# Patient Record
Sex: FEMALE | Race: WHITE | Employment: OTHER | URBAN - METROPOLITAN AREA
[De-identification: names, ages, dates, MRNs, and addresses within clinical notes are randomized per-mention and may not be internally consistent; named-entity substitution may affect disease eponyms.]

---

## 2019-05-13 ENCOUNTER — HOSPITAL ENCOUNTER (OUTPATIENT)
Dept: CARDIOLOGY | Facility: CLINIC | Age: 84
Discharge: HOME | End: 2019-05-13
Attending: PHYSICIAN ASSISTANT | Admitting: PHYSICIAN ASSISTANT
Payer: COMMERCIAL

## 2019-05-13 DIAGNOSIS — R01.1 SYSTOLIC MURMUR: ICD-10-CM

## 2019-05-13 PROCEDURE — 93306 TTE W/DOPPLER COMPLETE: CPT

## 2019-05-13 PROCEDURE — 93306 TTE W/DOPPLER COMPLETE: CPT | Mod: 26 | Performed by: INTERNAL MEDICINE

## 2019-09-09 ENCOUNTER — TELEPHONE (OUTPATIENT)
Dept: OTHER | Facility: CLINIC | Age: 84
End: 2019-09-09

## 2019-09-09 DIAGNOSIS — I73.9 CLAUDICATION (H): Primary | ICD-10-CM

## 2019-09-09 NOTE — TELEPHONE ENCOUNTER
Referral received via fax.     Pt referred to [facility] by Kristin Bo PA-C for bilateral calf claudication, left greater than right, unable to walk usual 3 miles a day.     Progress notes from [facility] in nurses office.     Pt needs to be scheduled for ESTEPHANIA with exercise (unless with Dr. Hernandez) and consult with vascular medicine.  Will route to scheduling to coordinate an appointment at next available.    CACHORRO PhanN, RN

## 2019-09-10 NOTE — TELEPHONE ENCOUNTER
No OV notes in chart or office.  I called [facility] to obtain LOV notes- no recent imaging completed.  Received fax with LOV and will give to Dr. Hernandez prior to consult.     America IVORYN, RN

## 2019-10-01 ENCOUNTER — OFFICE VISIT (OUTPATIENT)
Dept: SURGERY | Facility: CLINIC | Age: 84
End: 2019-10-01
Payer: COMMERCIAL

## 2019-10-01 ENCOUNTER — HOSPITAL ENCOUNTER (OUTPATIENT)
Dept: LAB | Facility: CLINIC | Age: 84
Discharge: HOME | End: 2019-10-01
Attending: INTERNAL MEDICINE | Admitting: INTERNAL MEDICINE
Payer: COMMERCIAL

## 2019-10-01 VITALS
RESPIRATION RATE: 16 BRPM | HEIGHT: 58 IN | HEART RATE: 85 BPM | SYSTOLIC BLOOD PRESSURE: 134 MMHG | BODY MASS INDEX: 23.09 KG/M2 | DIASTOLIC BLOOD PRESSURE: 84 MMHG | OXYGEN SATURATION: 96 % | WEIGHT: 110 LBS

## 2019-10-01 DIAGNOSIS — I73.9 PAD (PERIPHERAL ARTERY DISEASE) (H): Primary | ICD-10-CM

## 2019-10-01 DIAGNOSIS — R09.89 BILATERAL CAROTID BRUITS: ICD-10-CM

## 2019-10-01 DIAGNOSIS — I73.9 PAD (PERIPHERAL ARTERY DISEASE) (H): ICD-10-CM

## 2019-10-01 LAB
ALBUMIN SERPL-MCNC: 4.1 G/DL (ref 3.4–5)
ALP SERPL-CCNC: 68 U/L (ref 40–150)
ALT SERPL W P-5'-P-CCNC: 29 U/L (ref 0–50)
ANION GAP SERPL CALCULATED.3IONS-SCNC: 5 MMOL/L (ref 3–14)
AST SERPL W P-5'-P-CCNC: 22 U/L (ref 0–45)
BASOPHILS # BLD AUTO: 0 10E9/L (ref 0–0.2)
BASOPHILS NFR BLD AUTO: 0.6 %
BILIRUB SERPL-MCNC: 0.3 MG/DL (ref 0.2–1.3)
BUN SERPL-MCNC: 16 MG/DL (ref 7–30)
CALCIUM SERPL-MCNC: 9.4 MG/DL (ref 8.5–10.1)
CHLORIDE SERPL-SCNC: 108 MMOL/L (ref 94–109)
CHOLEST SERPL-MCNC: 193 MG/DL
CO2 SERPL-SCNC: 29 MMOL/L (ref 20–32)
CREAT SERPL-MCNC: 0.82 MG/DL (ref 0.52–1.04)
DIFFERENTIAL METHOD BLD: NORMAL
EOSINOPHIL # BLD AUTO: 0 10E9/L (ref 0–0.7)
EOSINOPHIL NFR BLD AUTO: 0.5 %
ERYTHROCYTE [DISTWIDTH] IN BLOOD BY AUTOMATED COUNT: 13.4 % (ref 10–15)
GFR SERPL CREATININE-BSD FRML MDRD: 66 ML/MIN/{1.73_M2}
GLUCOSE SERPL-MCNC: 94 MG/DL (ref 70–99)
HCT VFR BLD AUTO: 43.1 % (ref 35–47)
HDLC SERPL-MCNC: 64 MG/DL
HGB BLD-MCNC: 14.6 G/DL (ref 11.7–15.7)
IMM GRANULOCYTES # BLD: 0 10E9/L (ref 0–0.4)
IMM GRANULOCYTES NFR BLD: 0.2 %
LDLC SERPL CALC-MCNC: 113 MG/DL
LYMPHOCYTES # BLD AUTO: 2 10E9/L (ref 0.8–5.3)
LYMPHOCYTES NFR BLD AUTO: 31.2 %
MCH RBC QN AUTO: 30 PG (ref 26.5–33)
MCHC RBC AUTO-ENTMCNC: 33.9 G/DL (ref 31.5–36.5)
MCV RBC AUTO: 89 FL (ref 78–100)
MONOCYTES # BLD AUTO: 0.4 10E9/L (ref 0–1.3)
MONOCYTES NFR BLD AUTO: 6.9 %
NEUTROPHILS # BLD AUTO: 3.9 10E9/L (ref 1.6–8.3)
NEUTROPHILS NFR BLD AUTO: 60.6 %
NONHDLC SERPL-MCNC: 129 MG/DL
NRBC # BLD AUTO: 0 10*3/UL
NRBC BLD AUTO-RTO: 0 /100
PLATELET # BLD AUTO: 186 10E9/L (ref 150–450)
POTASSIUM SERPL-SCNC: 3.7 MMOL/L (ref 3.4–5.3)
PROT SERPL-MCNC: 7.5 G/DL (ref 6.8–8.8)
RBC # BLD AUTO: 4.87 10E12/L (ref 3.8–5.2)
SODIUM SERPL-SCNC: 142 MMOL/L (ref 133–144)
TRIGL SERPL-MCNC: 78 MG/DL
WBC # BLD AUTO: 6.4 10E9/L (ref 4–11)

## 2019-10-01 PROCEDURE — 80053 COMPREHEN METABOLIC PANEL: CPT | Performed by: INTERNAL MEDICINE

## 2019-10-01 PROCEDURE — 99204 OFFICE O/P NEW MOD 45 MIN: CPT | Performed by: INTERNAL MEDICINE

## 2019-10-01 PROCEDURE — 80061 LIPID PANEL: CPT | Performed by: INTERNAL MEDICINE

## 2019-10-01 PROCEDURE — 85025 COMPLETE CBC W/AUTO DIFF WBC: CPT | Performed by: INTERNAL MEDICINE

## 2019-10-01 PROCEDURE — 36415 COLL VENOUS BLD VENIPUNCTURE: CPT | Performed by: INTERNAL MEDICINE

## 2019-10-01 RX ORDER — VITS A,C,E/LUTEIN/MINERALS 300MCG-200
1 TABLET ORAL EVERY MORNING
COMMUNITY

## 2019-10-01 RX ORDER — SIMVASTATIN 10 MG
10 TABLET ORAL EVERY EVENING
Status: ON HOLD | COMMUNITY
Start: 2019-09-30 | End: 2019-12-05

## 2019-10-01 RX ORDER — AMLODIPINE BESYLATE 10 MG/1
10 TABLET ORAL EVERY EVENING
COMMUNITY
Start: 2019-09-30

## 2019-10-01 RX ORDER — MULTIPLE VITAMINS W/ MINERALS TAB 9MG-400MCG
1 TAB ORAL EVERY MORNING
COMMUNITY

## 2019-10-01 SDOH — HEALTH STABILITY: MENTAL HEALTH: HOW OFTEN DO YOU HAVE A DRINK CONTAINING ALCOHOL?: 2-3 TIMES A WEEK

## 2019-10-01 SDOH — HEALTH STABILITY: MENTAL HEALTH: HOW MANY DRINKS CONTAINING ALCOHOL DO YOU HAVE ON A TYPICAL DAY WHEN YOU ARE DRINKING?: 1 OR 2

## 2019-10-01 SDOH — HEALTH STABILITY: MENTAL HEALTH: HOW OFTEN DO YOU HAVE SIX OR MORE DRINKS ON ONE OCCASION?: NEVER

## 2019-10-01 ASSESSMENT — MIFFLIN-ST. JEOR: SCORE: 847.68

## 2019-10-01 NOTE — PROGRESS NOTES
Vascular Medicine Progress Note     Nova Ordoñez is a 84 year old female who is here for PAD evaluation    Interval History   Patient vascular risk factors include  Age  History of smoking  Hyperlipidemia patient is already on statin  Patient is nondiabetic  Patient has no problems with hypertension  Patient is considered pretty active  Patient is here for PAD evaluation, patient is having typical claudication she would walk for half a mile and she will start having pain involving both calves pain is so severe patient has to stop walking rest for a while then she can resume walking pain will start left leg then it will involve the right leg  Patient denies any history of rest pain, nocturnal pain, skin color or temperature changes, no history of gangrene in the past  Patient denies any history of coronary artery disease, no chest pain no shortness of breath no palpitations  Patient denies any family history of PAD  Patient is on blood pressure medication and statins  Patient does not take any aspirin    Physical Exam       BP: 134/84 Pulse: 85   Resp: 16 SpO2: 96 %      Vitals:    10/01/19 1303   Weight: 49.9 kg (110 lb)     Vital Signs with Ranges  Pulse:  [85] 85  Resp:  [16] 16  BP: (128-134)/(76-84) 134/84  SpO2:  [96 %] 96 %  [unfilled]    Constitutional: awake, alert, cooperative, no apparent distress, and appears stated age  Eyes: Lids and lashes normal, pupils equal, round and reactive to light, extra ocular muscles intact, sclera clear, conjunctiva normal  ENT: normocepalic, without obvious abnormality, oropharynx pink and moist  Hematologic / Lymphatic: no lymphadenopathy  Respiratory: No increased work of breathing, good air exchange, clear to auscultation bilaterally, no crackles or wheezing  Cardiovascular: regular rate and rhythm, normal S1 and S2 and no murmur noted  GI: Normal bowel sounds, soft, non-distended, non-tender  Skin: no redness, warmth, or swelling, no rashes and no  lesions  Musculoskeletal: There is no redness, warmth, or swelling of the joints.  Full range of motion noted.  Motor strength is 5 out of 5 all extremities bilaterally.  Tone is normal.  Neurologic: Awake, alert, oriented to name, place and time.  Cranial nerves II-XII are grossly intact.  Motor is 5 out of 5 bilaterally.    Neuropsychiatric:  Normal affect, memory, insight.  Pulses: Dopplerable DP and PT pulses, mainly biphasic signals obtained  .  Bilateral right greater than left carotid bruits appreciated.     Medications         Data   No results found for this or any previous visit (from the past 24 hour(s)).    Assessment & Plan   (I73.9) PAD (peripheral artery disease) (H)  (primary encounter diagnosis)  Comment: Bilateral lower extremity claudication most probably secondary to vascular etiology rather than neurogenic although neurogenic etiology cannot be dismissed as of now  Plan: CBC with platelets differential, Comprehensive         metabolic panel, Lipid Profile, US ESTEPHANIA Doppler         with Exercise Bilateral            (R09.89) Bilateral carotid bruits  Comment: Bilateral carotid bruits right greater than left  Plan: CBC with platelets differential, Comprehensive         metabolic panel, Lipid Profile, US Carotid         Bilateral                Summary: We will see the patient once test results are available    Omer Hernandez MD

## 2019-10-07 ENCOUNTER — HOSPITAL ENCOUNTER (OUTPATIENT)
Dept: ULTRASOUND IMAGING | Facility: CLINIC | Age: 84
Discharge: HOME | End: 2019-10-07
Attending: INTERNAL MEDICINE | Admitting: INTERNAL MEDICINE
Payer: COMMERCIAL

## 2019-10-07 ENCOUNTER — HOSPITAL ENCOUNTER (OUTPATIENT)
Dept: ULTRASOUND IMAGING | Facility: CLINIC | Age: 84
End: 2019-10-07
Attending: INTERNAL MEDICINE
Payer: COMMERCIAL

## 2019-10-07 DIAGNOSIS — I73.9 PAD (PERIPHERAL ARTERY DISEASE) (H): ICD-10-CM

## 2019-10-07 DIAGNOSIS — R09.89 BILATERAL CAROTID BRUITS: ICD-10-CM

## 2019-10-07 PROCEDURE — 93880 EXTRACRANIAL BILAT STUDY: CPT

## 2019-10-07 PROCEDURE — 93924 LWR XTR VASC STDY BILAT: CPT

## 2019-11-07 ENCOUNTER — TELEPHONE (OUTPATIENT)
Dept: OTHER | Facility: CLINIC | Age: 84
End: 2019-11-07

## 2019-11-07 DIAGNOSIS — I65.29 CAROTID STENOSIS: Primary | ICD-10-CM

## 2019-11-07 NOTE — TELEPHONE ENCOUNTER
Patient is scheduled for follow up with Dr. Hernandez on 11/12/19.    Carotid US on 10/7/19 showing:  IMPRESSION:    1. Greater than 70% diameter stenosis of the right ICA relative to the  distal ICA diameter.  2. Greater than 70% diameter stenosis of the left ICA relative to the  distal ICA diameter.    Wondering if he would like to order CTA head/neck and to schedule with vascular surgery instead or if he would like patient to still follow up on 11/12/19 and then decide.    America IVORYN, RN    [facility]  [facility]  Office: [phone]  Fax: [phone]

## 2019-11-07 NOTE — TELEPHONE ENCOUNTER
Per Dr. Hernandez patient needs CTA head and neck and consult with vascular surgery. I called patient and discussed.   CTA head and neck ordered- routing to scheduling to contact patient and schedule CTA and vascular surgery consult and please cancel appt with Dr. Hernandez.    America Zavala  BSN, RN    [facility]  [facility]  Office: [phone]  Fax: [phone]

## 2019-11-11 ENCOUNTER — HOSPITAL ENCOUNTER (OUTPATIENT)
Dept: CT IMAGING | Facility: CLINIC | Age: 84
Discharge: HOME | End: 2019-11-11
Attending: INTERNAL MEDICINE | Admitting: INTERNAL MEDICINE
Payer: COMMERCIAL

## 2019-11-11 DIAGNOSIS — I65.29 CAROTID STENOSIS: ICD-10-CM

## 2019-11-11 LAB
CREAT BLD-MCNC: 0.8 MG/DL (ref 0.52–1.04)
GFR SERPL CREATININE-BSD FRML MDRD: 69 ML/MIN/{1.73_M2}

## 2019-11-11 PROCEDURE — 82565 ASSAY OF CREATININE: CPT

## 2019-11-11 PROCEDURE — 25000125 ZZHC RX 250: Performed by: INTERNAL MEDICINE

## 2019-11-11 PROCEDURE — 70498 CT ANGIOGRAPHY NECK: CPT

## 2019-11-11 PROCEDURE — 25000128 H RX IP 250 OP 636: Performed by: INTERNAL MEDICINE

## 2019-11-11 RX ORDER — IOPAMIDOL 755 MG/ML
500 INJECTION, SOLUTION INTRAVASCULAR ONCE
Status: COMPLETED | OUTPATIENT
Start: 2019-11-11 | End: 2019-11-11

## 2019-11-11 RX ADMIN — SODIUM CHLORIDE 80 ML: 9 INJECTION, SOLUTION INTRAVENOUS at 16:15

## 2019-11-11 RX ADMIN — IOPAMIDOL 70 ML: 755 INJECTION, SOLUTION INTRAVENOUS at 16:15

## 2019-11-14 ENCOUNTER — OFFICE VISIT (OUTPATIENT)
Dept: SURGERY | Facility: CLINIC | Age: 84
End: 2019-11-14
Payer: COMMERCIAL

## 2019-11-14 VITALS
BODY MASS INDEX: 23.09 KG/M2 | RESPIRATION RATE: 16 BRPM | DIASTOLIC BLOOD PRESSURE: 72 MMHG | WEIGHT: 110 LBS | SYSTOLIC BLOOD PRESSURE: 128 MMHG | OXYGEN SATURATION: 95 % | HEIGHT: 58 IN | HEART RATE: 91 BPM

## 2019-11-14 DIAGNOSIS — I65.23 CAROTID STENOSIS, ASYMPTOMATIC, BILATERAL: Primary | ICD-10-CM

## 2019-11-14 PROCEDURE — 99204 OFFICE O/P NEW MOD 45 MIN: CPT | Performed by: SURGERY

## 2019-11-14 ASSESSMENT — MIFFLIN-ST. JEOR: SCORE: 847.68

## 2019-11-14 NOTE — PROGRESS NOTES
85 y/o female with bilateral asymptomatic carotid stenosis (althuogh she has macular degeneration and eye changes are difficult to assess).  Carotids 4/4 with bilateral bruits.  Motor/sensory grossly intact.  CTA shows bilateral carotid stenosis  Right-77%  Left-81%.  Discussed options of therapy as well as risks and goals.  I believe open left carotid endarterectomy is indicated.  She appears to understand and wishes to proceed.  Face to face time 45 minutes greater than 50% in consultation.

## 2019-11-15 PROBLEM — I65.23 CAROTID STENOSIS, ASYMPTOMATIC, BILATERAL: Status: ACTIVE | Noted: 2019-11-15

## 2019-11-25 ENCOUNTER — TELEPHONE (OUTPATIENT)
Dept: OTHER | Facility: CLINIC | Age: 84
End: 2019-11-25

## 2019-11-25 NOTE — TELEPHONE ENCOUNTER
Returned patient's call.    Patient IS NOT experiencing a new vision change.    She is concerned that the surgery may affect the already limited vision she has.    She would like to discuss this with a nurse.    Routing to surgical nurse triage.  Alice Busby RN BSN  Vascular [facility]

## 2019-11-25 NOTE — TELEPHONE ENCOUNTER
Will discuss with Dr. Marquez when he is in clinic Wednesday and call her back.    America PRIETO, RN    [facility]  Vascular [facility]  Office: [phone]  Fax: [phone]

## 2019-11-25 NOTE — TELEPHONE ENCOUNTER
Patient called to schedule her first PO after her 12/02/19 surgery with Dr Marquez. Patient is concerned about loss of vision in her left eye. She already has limited vision in that eye. Nova would like to talk to someone about this.

## 2019-11-27 ENCOUNTER — TELEPHONE (OUTPATIENT)
Dept: OTHER | Facility: CLINIC | Age: 84
End: 2019-11-27

## 2019-11-27 NOTE — TELEPHONE ENCOUNTER
Type of surgery: LEFT CAROTID ENDARTERECTOMY WITH EEG  Location of surgery: [facility]  Date and time of surgery: 12/4/19 @ 9:20 AM  Surgeon: DR. ROJAS  Pre-Op Appt Date: PT TO SCHEDULE  Post-Op Appt Date: PT TO SCHEDULE   Packet sent out: Yes  Pre-cert/Authorization completed:  Yes  Date: 11/27/19

## 2019-12-03 RX ORDER — MULTIVIT-MIN/IRON/FOLIC ACID/K 18-600-40
1000 CAPSULE ORAL EVERY EVENING
COMMUNITY

## 2019-12-04 ENCOUNTER — ANESTHESIA (OUTPATIENT)
Dept: SURGERY | Facility: CLINIC | Age: 84
DRG: 039 | End: 2019-12-04
Payer: COMMERCIAL

## 2019-12-04 ENCOUNTER — HOSPITAL ENCOUNTER (INPATIENT)
Facility: CLINIC | Age: 84
LOS: 1 days | Discharge: HOME | DRG: 039 | End: 2019-12-05
Attending: SURGERY | Admitting: SURGERY
Payer: COMMERCIAL

## 2019-12-04 ENCOUNTER — APPOINTMENT (OUTPATIENT)
Dept: SURGERY | Facility: PHYSICIAN GROUP | Age: 84
End: 2019-12-04
Payer: COMMERCIAL

## 2019-12-04 ENCOUNTER — ANESTHESIA EVENT (OUTPATIENT)
Dept: SURGERY | Facility: CLINIC | Age: 84
DRG: 039 | End: 2019-12-04
Payer: COMMERCIAL

## 2019-12-04 DIAGNOSIS — I65.23 CAROTID STENOSIS, ASYMPTOMATIC, BILATERAL: ICD-10-CM

## 2019-12-04 DIAGNOSIS — I65.21 CAROTID STENOSIS, ASYMPTOMATIC, RIGHT: Primary | ICD-10-CM

## 2019-12-04 LAB
ABO + RH BLD: NORMAL
ABO + RH BLD: NORMAL
ANION GAP SERPL CALCULATED.3IONS-SCNC: 3 MMOL/L (ref 3–14)
BLD GP AB SCN SERPL QL: NORMAL
BLOOD BANK CMNT PATIENT-IMP: NORMAL
BUN SERPL-MCNC: 17 MG/DL (ref 7–30)
CALCIUM SERPL-MCNC: 9 MG/DL (ref 8.5–10.1)
CHLORIDE SERPL-SCNC: 108 MMOL/L (ref 94–109)
CHOLEST SERPL-MCNC: 150 MG/DL
CO2 SERPL-SCNC: 28 MMOL/L (ref 20–32)
CREAT SERPL-MCNC: 0.88 MG/DL (ref 0.52–1.04)
GFR SERPL CREATININE-BSD FRML MDRD: 60 ML/MIN/{1.73_M2}
GLUCOSE SERPL-MCNC: 91 MG/DL (ref 70–99)
HBA1C MFR BLD: 5.4 % (ref 0–5.6)
HDLC SERPL-MCNC: 68 MG/DL
LDLC SERPL CALC-MCNC: 69 MG/DL
NONHDLC SERPL-MCNC: 82 MG/DL
POTASSIUM SERPL-SCNC: 3.2 MMOL/L (ref 3.4–5.3)
SODIUM SERPL-SCNC: 139 MMOL/L (ref 133–144)
SPECIMEN EXP DATE BLD: NORMAL
TRIGL SERPL-MCNC: 66 MG/DL

## 2019-12-04 PROCEDURE — 35301 RECHANNELING OF ARTERY: CPT | Mod: LT | Performed by: SURGERY

## 2019-12-04 PROCEDURE — 25000128 H RX IP 250 OP 636: Performed by: SURGERY

## 2019-12-04 PROCEDURE — 36415 COLL VENOUS BLD VENIPUNCTURE: CPT | Performed by: SURGERY

## 2019-12-04 PROCEDURE — 37000009 ZZH ANESTHESIA TECHNICAL FEE, EACH ADDTL 15 MIN: Performed by: SURGERY

## 2019-12-04 PROCEDURE — 25000128 H RX IP 250 OP 636: Performed by: ANESTHESIOLOGY

## 2019-12-04 PROCEDURE — 25000125 ZZHC RX 250: Performed by: NURSE ANESTHETIST, CERTIFIED REGISTERED

## 2019-12-04 PROCEDURE — 93005 ELECTROCARDIOGRAM TRACING: CPT

## 2019-12-04 PROCEDURE — 25000132 ZZH RX MED GY IP 250 OP 250 PS 637: Performed by: PHYSICIAN ASSISTANT

## 2019-12-04 PROCEDURE — 27211022 ZZHC OR IOM SUPPLIES OPNP: Performed by: SURGERY

## 2019-12-04 PROCEDURE — 12000000 ZZH R&B MED SURG/OB

## 2019-12-04 PROCEDURE — 25000128 H RX IP 250 OP 636: Performed by: PHYSICIAN ASSISTANT

## 2019-12-04 PROCEDURE — 25000125 ZZHC RX 250: Performed by: SURGERY

## 2019-12-04 PROCEDURE — 25000132 ZZH RX MED GY IP 250 OP 250 PS 637: Performed by: INTERNAL MEDICINE

## 2019-12-04 PROCEDURE — 86850 RBC ANTIBODY SCREEN: CPT | Performed by: SURGERY

## 2019-12-04 PROCEDURE — 93010 ELECTROCARDIOGRAM REPORT: CPT | Performed by: INTERNAL MEDICINE

## 2019-12-04 PROCEDURE — C1768 GRAFT, VASCULAR: HCPCS | Performed by: SURGERY

## 2019-12-04 PROCEDURE — 25000132 ZZH RX MED GY IP 250 OP 250 PS 637: Performed by: SURGERY

## 2019-12-04 PROCEDURE — 86901 BLOOD TYPING SEROLOGIC RH(D): CPT | Performed by: SURGERY

## 2019-12-04 PROCEDURE — 86900 BLOOD TYPING SEROLOGIC ABO: CPT | Performed by: SURGERY

## 2019-12-04 PROCEDURE — 25000566 ZZH SEVOFLURANE, EA 15 MIN: Performed by: SURGERY

## 2019-12-04 PROCEDURE — 25800030 ZZH RX IP 258 OP 636: Performed by: PHYSICIAN ASSISTANT

## 2019-12-04 PROCEDURE — 03CL0ZZ EXTIRPATION OF MATTER FROM LEFT INTERNAL CAROTID ARTERY, OPEN APPROACH: ICD-10-PCS | Performed by: SURGERY

## 2019-12-04 PROCEDURE — 80061 LIPID PANEL: CPT | Performed by: SURGERY

## 2019-12-04 PROCEDURE — 25000128 H RX IP 250 OP 636: Performed by: NURSE ANESTHETIST, CERTIFIED REGISTERED

## 2019-12-04 PROCEDURE — 27210794 ZZH OR GENERAL SUPPLY STERILE: Performed by: SURGERY

## 2019-12-04 PROCEDURE — 25800030 ZZH RX IP 258 OP 636: Performed by: NURSE ANESTHETIST, CERTIFIED REGISTERED

## 2019-12-04 PROCEDURE — 25800030 ZZH RX IP 258 OP 636: Performed by: SURGERY

## 2019-12-04 PROCEDURE — 36000093 ZZH SURGERY LEVEL 4 1ST 30 MIN: Performed by: SURGERY

## 2019-12-04 PROCEDURE — 95940 IONM IN OPERATNG ROOM 15 MIN: CPT | Performed by: SURGERY

## 2019-12-04 PROCEDURE — 36000063 ZZH SURGERY LEVEL 4 EA 15 ADDTL MIN: Performed by: SURGERY

## 2019-12-04 PROCEDURE — 71000012 ZZH RECOVERY PHASE 1 LEVEL 1 FIRST HR: Performed by: SURGERY

## 2019-12-04 PROCEDURE — 40000170 ZZH STATISTIC PRE-PROCEDURE ASSESSMENT II: Performed by: SURGERY

## 2019-12-04 PROCEDURE — 99223 1ST HOSP IP/OBS HIGH 75: CPT | Performed by: INTERNAL MEDICINE

## 2019-12-04 PROCEDURE — 83036 HEMOGLOBIN GLYCOSYLATED A1C: CPT | Performed by: SURGERY

## 2019-12-04 PROCEDURE — 03UL0JZ SUPPLEMENT LEFT INTERNAL CAROTID ARTERY WITH SYNTHETIC SUBSTITUTE, OPEN APPROACH: ICD-10-PCS | Performed by: SURGERY

## 2019-12-04 PROCEDURE — 37000008 ZZH ANESTHESIA TECHNICAL FEE, 1ST 30 MIN: Performed by: SURGERY

## 2019-12-04 PROCEDURE — 25800030 ZZH RX IP 258 OP 636: Performed by: ANESTHESIOLOGY

## 2019-12-04 PROCEDURE — 4A10X4Z MONITORING OF CENTRAL NERVOUS ELECTRICAL ACTIVITY, EXTERNAL APPROACH: ICD-10-PCS | Performed by: SURGERY

## 2019-12-04 PROCEDURE — 80048 BASIC METABOLIC PNL TOTAL CA: CPT | Performed by: SURGERY

## 2019-12-04 DEVICE — IMPLANTABLE DEVICE: Type: IMPLANTABLE DEVICE | Site: CAROTID | Status: FUNCTIONAL

## 2019-12-04 RX ORDER — SODIUM CHLORIDE 9 MG/ML
INJECTION, SOLUTION INTRAVENOUS CONTINUOUS
Status: DISCONTINUED | OUTPATIENT
Start: 2019-12-04 | End: 2019-12-05 | Stop reason: HOSPADM

## 2019-12-04 RX ORDER — ACETAMINOPHEN 325 MG/1
650 TABLET ORAL EVERY 4 HOURS PRN
Status: DISCONTINUED | OUTPATIENT
Start: 2019-12-07 | End: 2019-12-05 | Stop reason: HOSPADM

## 2019-12-04 RX ORDER — ASPIRIN 81 MG/1
81 TABLET ORAL DAILY
Status: DISCONTINUED | OUTPATIENT
Start: 2019-12-05 | End: 2019-12-05 | Stop reason: HOSPADM

## 2019-12-04 RX ORDER — AMOXICILLIN 250 MG
1 CAPSULE ORAL 2 TIMES DAILY
Status: DISCONTINUED | OUTPATIENT
Start: 2019-12-04 | End: 2019-12-05 | Stop reason: HOSPADM

## 2019-12-04 RX ORDER — FENTANYL CITRATE 50 UG/ML
25-50 INJECTION, SOLUTION INTRAMUSCULAR; INTRAVENOUS
Status: DISCONTINUED | OUTPATIENT
Start: 2019-12-04 | End: 2019-12-04 | Stop reason: HOSPADM

## 2019-12-04 RX ORDER — CEFAZOLIN SODIUM 2 G/100ML
2 INJECTION, SOLUTION INTRAVENOUS
Status: COMPLETED | OUTPATIENT
Start: 2019-12-04 | End: 2019-12-04

## 2019-12-04 RX ORDER — ESMOLOL HYDROCHLORIDE 10 MG/ML
INJECTION INTRAVENOUS PRN
Status: DISCONTINUED | OUTPATIENT
Start: 2019-12-04 | End: 2019-12-04

## 2019-12-04 RX ORDER — ONDANSETRON 4 MG/1
4 TABLET, ORALLY DISINTEGRATING ORAL EVERY 30 MIN PRN
Status: DISCONTINUED | OUTPATIENT
Start: 2019-12-04 | End: 2019-12-04 | Stop reason: HOSPADM

## 2019-12-04 RX ORDER — HYDROMORPHONE HYDROCHLORIDE 1 MG/ML
0.2 INJECTION, SOLUTION INTRAMUSCULAR; INTRAVENOUS; SUBCUTANEOUS
Status: DISCONTINUED | OUTPATIENT
Start: 2019-12-04 | End: 2019-12-05 | Stop reason: HOSPADM

## 2019-12-04 RX ORDER — SIMVASTATIN 10 MG
10 TABLET ORAL EVERY EVENING
Status: DISCONTINUED | OUTPATIENT
Start: 2019-12-04 | End: 2019-12-04

## 2019-12-04 RX ORDER — PROPOFOL 10 MG/ML
INJECTION, EMULSION INTRAVENOUS CONTINUOUS PRN
Status: DISCONTINUED | OUTPATIENT
Start: 2019-12-04 | End: 2019-12-04

## 2019-12-04 RX ORDER — NALOXONE HYDROCHLORIDE 0.4 MG/ML
.1-.4 INJECTION, SOLUTION INTRAMUSCULAR; INTRAVENOUS; SUBCUTANEOUS
Status: DISCONTINUED | OUTPATIENT
Start: 2019-12-04 | End: 2019-12-04 | Stop reason: HOSPADM

## 2019-12-04 RX ORDER — FAMOTIDINE 20 MG/1
20 TABLET, FILM COATED ORAL EVERY 24 HOURS
Status: DISCONTINUED | OUTPATIENT
Start: 2019-12-04 | End: 2019-12-05 | Stop reason: HOSPADM

## 2019-12-04 RX ORDER — HYDROMORPHONE HYDROCHLORIDE 1 MG/ML
.3-.5 INJECTION, SOLUTION INTRAMUSCULAR; INTRAVENOUS; SUBCUTANEOUS EVERY 10 MIN PRN
Status: DISCONTINUED | OUTPATIENT
Start: 2019-12-04 | End: 2019-12-04 | Stop reason: HOSPADM

## 2019-12-04 RX ORDER — SODIUM CHLORIDE, SODIUM LACTATE, POTASSIUM CHLORIDE, CALCIUM CHLORIDE 600; 310; 30; 20 MG/100ML; MG/100ML; MG/100ML; MG/100ML
INJECTION, SOLUTION INTRAVENOUS CONTINUOUS PRN
Status: DISCONTINUED | OUTPATIENT
Start: 2019-12-04 | End: 2019-12-04

## 2019-12-04 RX ORDER — DEXAMETHASONE SODIUM PHOSPHATE 4 MG/ML
INJECTION, SOLUTION INTRA-ARTICULAR; INTRALESIONAL; INTRAMUSCULAR; INTRAVENOUS; SOFT TISSUE PRN
Status: DISCONTINUED | OUTPATIENT
Start: 2019-12-04 | End: 2019-12-04

## 2019-12-04 RX ORDER — EPHEDRINE SULFATE 50 MG/ML
INJECTION, SOLUTION INTRAMUSCULAR; INTRAVENOUS; SUBCUTANEOUS PRN
Status: DISCONTINUED | OUTPATIENT
Start: 2019-12-04 | End: 2019-12-04

## 2019-12-04 RX ORDER — AMLODIPINE BESYLATE 10 MG/1
10 TABLET ORAL EVERY EVENING
Status: DISCONTINUED | OUTPATIENT
Start: 2019-12-04 | End: 2019-12-05 | Stop reason: HOSPADM

## 2019-12-04 RX ORDER — PROPOFOL 10 MG/ML
INJECTION, EMULSION INTRAVENOUS PRN
Status: DISCONTINUED | OUTPATIENT
Start: 2019-12-04 | End: 2019-12-04

## 2019-12-04 RX ORDER — SODIUM CHLORIDE, SODIUM LACTATE, POTASSIUM CHLORIDE, CALCIUM CHLORIDE 600; 310; 30; 20 MG/100ML; MG/100ML; MG/100ML; MG/100ML
INJECTION, SOLUTION INTRAVENOUS CONTINUOUS
Status: DISCONTINUED | OUTPATIENT
Start: 2019-12-04 | End: 2019-12-04 | Stop reason: HOSPADM

## 2019-12-04 RX ORDER — MAGNESIUM HYDROXIDE 1200 MG/15ML
LIQUID ORAL PRN
Status: DISCONTINUED | OUTPATIENT
Start: 2019-12-04 | End: 2019-12-04 | Stop reason: HOSPADM

## 2019-12-04 RX ORDER — ATORVASTATIN CALCIUM 40 MG/1
40 TABLET, FILM COATED ORAL EVERY EVENING
Status: DISCONTINUED | OUTPATIENT
Start: 2019-12-04 | End: 2019-12-05 | Stop reason: HOSPADM

## 2019-12-04 RX ORDER — ONDANSETRON 2 MG/ML
4 INJECTION INTRAMUSCULAR; INTRAVENOUS EVERY 30 MIN PRN
Status: DISCONTINUED | OUTPATIENT
Start: 2019-12-04 | End: 2019-12-04 | Stop reason: HOSPADM

## 2019-12-04 RX ORDER — ONDANSETRON 2 MG/ML
4 INJECTION INTRAMUSCULAR; INTRAVENOUS EVERY 6 HOURS PRN
Status: DISCONTINUED | OUTPATIENT
Start: 2019-12-04 | End: 2019-12-05 | Stop reason: HOSPADM

## 2019-12-04 RX ORDER — LABETALOL HYDROCHLORIDE 5 MG/ML
10 INJECTION, SOLUTION INTRAVENOUS EVERY 10 MIN PRN
Status: DISCONTINUED | OUTPATIENT
Start: 2019-12-04 | End: 2019-12-05 | Stop reason: HOSPADM

## 2019-12-04 RX ORDER — ONDANSETRON 4 MG/1
4 TABLET, ORALLY DISINTEGRATING ORAL EVERY 6 HOURS PRN
Status: DISCONTINUED | OUTPATIENT
Start: 2019-12-04 | End: 2019-12-05 | Stop reason: HOSPADM

## 2019-12-04 RX ORDER — HEPARIN SODIUM 1000 [USP'U]/ML
INJECTION, SOLUTION INTRAVENOUS; SUBCUTANEOUS
Status: DISCONTINUED
Start: 2019-12-04 | End: 2019-12-04 | Stop reason: HOSPADM

## 2019-12-04 RX ORDER — METOPROLOL TARTRATE 1 MG/ML
5 INJECTION, SOLUTION INTRAVENOUS EVERY 6 HOURS PRN
Status: DISCONTINUED | OUTPATIENT
Start: 2019-12-04 | End: 2019-12-05 | Stop reason: HOSPADM

## 2019-12-04 RX ORDER — PROCHLORPERAZINE MALEATE 5 MG
5 TABLET ORAL EVERY 6 HOURS PRN
Status: DISCONTINUED | OUTPATIENT
Start: 2019-12-04 | End: 2019-12-05 | Stop reason: HOSPADM

## 2019-12-04 RX ORDER — NALOXONE HYDROCHLORIDE 0.4 MG/ML
.1-.4 INJECTION, SOLUTION INTRAMUSCULAR; INTRAVENOUS; SUBCUTANEOUS
Status: DISCONTINUED | OUTPATIENT
Start: 2019-12-04 | End: 2019-12-05 | Stop reason: HOSPADM

## 2019-12-04 RX ORDER — CEFAZOLIN SODIUM 1 G/3ML
1 INJECTION, POWDER, FOR SOLUTION INTRAMUSCULAR; INTRAVENOUS SEE ADMIN INSTRUCTIONS
Status: DISCONTINUED | OUTPATIENT
Start: 2019-12-04 | End: 2019-12-04 | Stop reason: HOSPADM

## 2019-12-04 RX ORDER — CEFAZOLIN SODIUM 2 G/100ML
2 INJECTION, SOLUTION INTRAVENOUS
Status: DISCONTINUED | OUTPATIENT
Start: 2019-12-04 | End: 2019-12-04 | Stop reason: HOSPADM

## 2019-12-04 RX ORDER — ACETAMINOPHEN 325 MG/1
975 TABLET ORAL EVERY 8 HOURS SCHEDULED
Status: DISCONTINUED | OUTPATIENT
Start: 2019-12-04 | End: 2019-12-05 | Stop reason: HOSPADM

## 2019-12-04 RX ORDER — ASPIRIN 600 MG/1
600 SUPPOSITORY RECTAL ONCE
Status: COMPLETED | OUTPATIENT
Start: 2019-12-04 | End: 2019-12-04

## 2019-12-04 RX ORDER — NAPROXEN SODIUM 220 MG
220 TABLET ORAL 2 TIMES DAILY PRN
COMMUNITY

## 2019-12-04 RX ORDER — FENTANYL CITRATE 50 UG/ML
25 INJECTION, SOLUTION INTRAMUSCULAR; INTRAVENOUS
Status: DISCONTINUED | OUTPATIENT
Start: 2019-12-04 | End: 2019-12-04 | Stop reason: HOSPADM

## 2019-12-04 RX ORDER — FAMOTIDINE 20 MG/1
20 TABLET, FILM COATED ORAL 2 TIMES DAILY
Status: DISCONTINUED | OUTPATIENT
Start: 2019-12-04 | End: 2019-12-04

## 2019-12-04 RX ORDER — HEPARIN SODIUM 1000 [USP'U]/ML
INJECTION, SOLUTION INTRAVENOUS; SUBCUTANEOUS PRN
Status: DISCONTINUED | OUTPATIENT
Start: 2019-12-04 | End: 2019-12-04 | Stop reason: HOSPADM

## 2019-12-04 RX ORDER — LABETALOL HYDROCHLORIDE 5 MG/ML
10 INJECTION, SOLUTION INTRAVENOUS EVERY 10 MIN PRN
Status: DISCONTINUED | OUTPATIENT
Start: 2019-12-04 | End: 2019-12-04

## 2019-12-04 RX ORDER — POTASSIUM CHLORIDE 1.5 G/1.58G
20 POWDER, FOR SOLUTION ORAL DAILY
Status: DISCONTINUED | OUTPATIENT
Start: 2019-12-04 | End: 2019-12-05 | Stop reason: HOSPADM

## 2019-12-04 RX ORDER — OXYCODONE HYDROCHLORIDE 5 MG/1
5 TABLET ORAL
Status: DISCONTINUED | OUTPATIENT
Start: 2019-12-04 | End: 2019-12-05 | Stop reason: HOSPADM

## 2019-12-04 RX ORDER — LIDOCAINE 40 MG/G
CREAM TOPICAL
Status: DISCONTINUED | OUTPATIENT
Start: 2019-12-04 | End: 2019-12-05 | Stop reason: HOSPADM

## 2019-12-04 RX ORDER — AMOXICILLIN 250 MG
2 CAPSULE ORAL 2 TIMES DAILY
Status: DISCONTINUED | OUTPATIENT
Start: 2019-12-04 | End: 2019-12-05 | Stop reason: HOSPADM

## 2019-12-04 RX ORDER — ONDANSETRON 2 MG/ML
INJECTION INTRAMUSCULAR; INTRAVENOUS PRN
Status: DISCONTINUED | OUTPATIENT
Start: 2019-12-04 | End: 2019-12-04

## 2019-12-04 RX ORDER — HEPARIN SODIUM 1000 [USP'U]/ML
INJECTION, SOLUTION INTRAVENOUS; SUBCUTANEOUS PRN
Status: DISCONTINUED | OUTPATIENT
Start: 2019-12-04 | End: 2019-12-04

## 2019-12-04 RX ADMIN — HYDROMORPHONE HYDROCHLORIDE 0.2 MG: 1 INJECTION, SOLUTION INTRAMUSCULAR; INTRAVENOUS; SUBCUTANEOUS at 15:33

## 2019-12-04 RX ADMIN — Medication 5 MG: at 09:47

## 2019-12-04 RX ADMIN — PHENYLEPHRINE HYDROCHLORIDE 50 MCG: 10 INJECTION INTRAVENOUS at 10:07

## 2019-12-04 RX ADMIN — DEXMEDETOMIDINE HYDROCHLORIDE 0.3 MCG/KG/HR: 100 INJECTION, SOLUTION INTRAVENOUS at 09:31

## 2019-12-04 RX ADMIN — PHENYLEPHRINE HYDROCHLORIDE 50 MCG: 10 INJECTION INTRAVENOUS at 10:29

## 2019-12-04 RX ADMIN — ASPIRIN 600 MG: 600 SUPPOSITORY RECTAL at 12:27

## 2019-12-04 RX ADMIN — ACETAMINOPHEN 975 MG: 325 TABLET, FILM COATED ORAL at 22:21

## 2019-12-04 RX ADMIN — SODIUM CHLORIDE, POTASSIUM CHLORIDE, SODIUM LACTATE AND CALCIUM CHLORIDE: 600; 310; 30; 20 INJECTION, SOLUTION INTRAVENOUS at 09:32

## 2019-12-04 RX ADMIN — ATORVASTATIN CALCIUM 40 MG: 40 TABLET, FILM COATED ORAL at 20:06

## 2019-12-04 RX ADMIN — HEPARIN SODIUM 4000 UNITS: 1000 INJECTION, SOLUTION INTRAVENOUS; SUBCUTANEOUS at 10:06

## 2019-12-04 RX ADMIN — ESMOLOL HYDROCHLORIDE 20 MG: 10 INJECTION, SOLUTION INTRAVENOUS at 11:15

## 2019-12-04 RX ADMIN — FAMOTIDINE 20 MG: 20 TABLET, FILM COATED ORAL at 15:33

## 2019-12-04 RX ADMIN — CEFAZOLIN SODIUM 2 G: 2 INJECTION, SOLUTION INTRAVENOUS at 09:33

## 2019-12-04 RX ADMIN — PHENYLEPHRINE HYDROCHLORIDE 100 MCG: 10 INJECTION INTRAVENOUS at 09:45

## 2019-12-04 RX ADMIN — POTASSIUM CHLORIDE 20 MEQ: 1.5 POWDER, FOR SOLUTION ORAL at 20:06

## 2019-12-04 RX ADMIN — SENNOSIDES AND DOCUSATE SODIUM 1 TABLET: 8.6; 5 TABLET ORAL at 20:06

## 2019-12-04 RX ADMIN — SUGAMMADEX 150 MG: 100 INJECTION, SOLUTION INTRAVENOUS at 11:25

## 2019-12-04 RX ADMIN — OXYCODONE HYDROCHLORIDE 5 MG: 5 TABLET ORAL at 17:01

## 2019-12-04 RX ADMIN — PHENYLEPHRINE HYDROCHLORIDE 100 MCG: 10 INJECTION INTRAVENOUS at 10:09

## 2019-12-04 RX ADMIN — PROPOFOL 20 MCG/KG/MIN: 10 INJECTION, EMULSION INTRAVENOUS at 09:30

## 2019-12-04 RX ADMIN — Medication 5 MG: at 10:09

## 2019-12-04 RX ADMIN — AMLODIPINE BESYLATE 10 MG: 10 TABLET ORAL at 16:56

## 2019-12-04 RX ADMIN — ROCURONIUM BROMIDE 10 MG: 10 INJECTION INTRAVENOUS at 09:49

## 2019-12-04 RX ADMIN — ACETAMINOPHEN 975 MG: 325 TABLET, FILM COATED ORAL at 15:33

## 2019-12-04 RX ADMIN — PHENYLEPHRINE HYDROCHLORIDE 50 MCG: 10 INJECTION INTRAVENOUS at 09:40

## 2019-12-04 RX ADMIN — DEXAMETHASONE SODIUM PHOSPHATE 4 MG: 4 INJECTION, SOLUTION INTRA-ARTICULAR; INTRALESIONAL; INTRAMUSCULAR; INTRAVENOUS; SOFT TISSUE at 09:48

## 2019-12-04 RX ADMIN — PHENYLEPHRINE HYDROCHLORIDE 100 MCG: 10 INJECTION INTRAVENOUS at 09:44

## 2019-12-04 RX ADMIN — PHENYLEPHRINE HYDROCHLORIDE 100 MCG: 10 INJECTION INTRAVENOUS at 09:33

## 2019-12-04 RX ADMIN — HYDROMORPHONE HYDROCHLORIDE 0.5 MG: 1 INJECTION, SOLUTION INTRAMUSCULAR; INTRAVENOUS; SUBCUTANEOUS at 12:22

## 2019-12-04 RX ADMIN — PHENYLEPHRINE HYDROCHLORIDE 100 MCG: 10 INJECTION INTRAVENOUS at 09:34

## 2019-12-04 RX ADMIN — ROCURONIUM BROMIDE 10 MG: 10 INJECTION INTRAVENOUS at 10:32

## 2019-12-04 RX ADMIN — PROPOFOL 20 MG: 10 INJECTION, EMULSION INTRAVENOUS at 11:18

## 2019-12-04 RX ADMIN — PHENYLEPHRINE HYDROCHLORIDE 50 MCG: 10 INJECTION INTRAVENOUS at 10:38

## 2019-12-04 RX ADMIN — PROPOFOL 20 MG: 10 INJECTION, EMULSION INTRAVENOUS at 09:36

## 2019-12-04 RX ADMIN — ONDANSETRON 4 MG: 2 INJECTION INTRAMUSCULAR; INTRAVENOUS at 11:06

## 2019-12-04 RX ADMIN — PHENYLEPHRINE HYDROCHLORIDE 50 MCG: 10 INJECTION INTRAVENOUS at 10:59

## 2019-12-04 RX ADMIN — SODIUM CHLORIDE: 9 INJECTION, SOLUTION INTRAVENOUS at 15:37

## 2019-12-04 RX ADMIN — PHENYLEPHRINE HYDROCHLORIDE 0.25 MCG/KG/MIN: 10 INJECTION INTRAVENOUS at 09:34

## 2019-12-04 RX ADMIN — SODIUM CHLORIDE, POTASSIUM CHLORIDE, SODIUM LACTATE AND CALCIUM CHLORIDE: 600; 310; 30; 20 INJECTION, SOLUTION INTRAVENOUS at 08:00

## 2019-12-04 ASSESSMENT — ACTIVITIES OF DAILY LIVING (ADL)
ADLS_ACUITY_SCORE: 13
ADLS_ACUITY_SCORE: 13

## 2019-12-04 ASSESSMENT — ENCOUNTER SYMPTOMS: SEIZURES: 0

## 2019-12-04 ASSESSMENT — MIFFLIN-ST. JEOR: SCORE: 863.21

## 2019-12-04 ASSESSMENT — LIFESTYLE VARIABLES: TOBACCO_USE: 0

## 2019-12-04 NOTE — PLAN OF CARE
Arrived from PACU at 1345. A&O x4. VSS on room air. Tele NSR. CMS intact. Neuros intact. Lungs clear. BS+, BM-, flatus-. Left neck incision w/marked drainage. Tolerating low fat diet. Denies N/V. Voiding adequately w/bedside commode. Oxycodone for pain. Up w/1 and BR w/commode.

## 2019-12-04 NOTE — PROGRESS NOTES
Medication History Completed by Medication Scribe  Admission medication history interview status for the 12/4/2019  admission is complete. See EPIC admission navigator for prior to admission medications     Medication history sources: Patient, H&P and Care Everywhere  Medication history source reliability: Moderate  Adherence assessment: N/A Not Observed    Significant changes made to the medication list:  None      Additional medication history information:   None    Medication reconciliation completed by provider prior to medication history? No    Time spent in this activity: 30 MINUTES      Prior to Admission medications    Medication Sig Last Dose Taking? Auth Provider   amLODIPine (NORVASC) 10 MG tablet Take 10 mg by mouth daily  12/3/2019 at 1600 Yes Reported, Patient   bevacizumab (AVASTIN) 25 MG/ML injection Inject into the vein See Admin Instructions GETS EVERY 5 WEEKS. (PT UNSURE OF DOSE) OCTOBER Yes Reported, Patient   Glucosamine HCl (GLUCOSAMINE PO) Take 1 tablet by mouth every morning 12/3/2019 at AM Yes Reported, Patient   hypromellose (ARTIFICIAL TEARS) 0.5 % SOLN ophthalmic solution Place 1 drop into both eyes 4 times daily as needed for dry eyes 12/4/2019 at 0500 Yes Reported, Patient   multivitamin (OCUVITE) TABS tablet Take 1 tablet by mouth every morning  12/3/2019 at AM Yes Reported, Patient   multivitamin w/minerals (MULTI-VITAMIN) tablet Take 1 tablet by mouth every morning  12/3/2019 at AM Yes Reported, Patient   naproxen sodium (ANAPROX) 220 MG tablet Take 220 mg by mouth daily as needed for moderate pain MORE THAN A WEEK at PRN Yes Reported, Patient   simvastatin (ZOCOR) 10 MG tablet Take 10 mg by mouth every evening  12/3/2019 at PM Yes Reported, Patient   Vitamin D, Cholecalciferol, 25 MCG (1000 UT) TABS Take 1,000 Units by mouth every morning  12/3/2019 at AM Yes Reported, Patient

## 2019-12-04 NOTE — CONSULTS
[facility]  Vascular Medicine Consultation         Reyna Alexandre MD, LEONARDO,[facility]    Nova Ordoñez MRN# [id number]   YOB: 1935 Age: 84 year old      Date of Admission:  12/4/2019  Date of Consult: 12/4/2019         Assessment and Plan:     1.  Bilateral asymptomatic high-grade carotid stenosis (81%LICA stenosis and 77%TITO stenosis)  S/p left carotid endarterectomy on December 4, 2019    POD 0   This is a very pleasant 84-year-old female former smoker with bilateral carotid bruits underwent carotid ultrasound then followed by CTA which revealed left side 81% stenosis in the right side 77% stenosis, she is asymptomatic underwent successful carotid endarterectomy today.  She was seen and evaluated after the surgery  Surgical site looks good  No neuro deficits  Hemodynamically stable   Low potassium this morning labs 3.2    Plan:  Monitor neurochecks  Keep systolic blood pressure under 130, cover with IV labetalol 10 mg every 4 hours PRN if needed  Aspirin daily  Change low-dose simvastatin to atorvastatin 40 mg daily  Continue rest of the outpatient medications same  Repeat potassium in the morning and replace    2.  Hyperlipidemia:  She is been taking low-dose simvastatin 10 mg daily LDL is less than 70 but given bilateral carotid stenosis underwent carotid endarterectomy she will benefit with more potent statin will switch to atorvastatin 40 mg daily    3.  Hypertension;  Well-controlled with current medications continue the same outpatient dose of amlodipine    4.  Aortic stenosis: (Echocardiogram May 13, 2019)  Normal LV function, meanAoV pressure gradient is 13 mmHg  Mild to moderate aortic stenosis with valve area is 1.4 cm     She will need repeat echocardiogram in May 2020  Optimize risk factors, treated with more potent statin.    This note was dictated by utilizing dragon software    Thank you for the consultation    Vascular medicine service will follow with you    Copy  of this dictation to Dr. Marquez and primary care physician    Reyna Alexandre MD,FA,.[facility]  Vascular medicine service              Code Status:   Full Code         Primary Care Physician:   PaDina goss [phone]         Requesting Physician:        ENRIQUE Marquez MD         Chief Complaint:   Evaluation management of vascular risk factors underwent left carotid endarterectomy today    History is obtained from the patient, reviewed epic         History of Present Illness:   Nova Ordoñez is a 84 year old very pleasant female former smoker quit more than 10 years ago with history of hypertension, aortic stenosis, hyperlipidemia, macular degeneration recently noted bilateral carotid bruits underwent work-up including the carotid ultrasound which showed greater than 70% stenosis bilaterally left worse than right side and followed by CTA revealed 81% stenosis of LICA and 77% stenosis of TITO underwent successful left carotid endarterectomy today.  She was seen and evaluated after surgery  She is hemodynamically stable  Surgical site looks good  I was asked to evaluate vascular risk factor management  She denies any chest pain, shortness of breath or palpitations  She is taking low-dose simvastatin 10 mg daily and LDL less than 70  History of aortic stenosis with 1.4 cm  valve area on her echocardiogram in May 2019.  She has normal LV function  Reviewed imaging studies, laboratory data in the epic.           Past Medical History:     Past Medical History:   Diagnosis Date     Aortic stenosis      Endometrial cancer (H)      [place] (hard of hearing)     [place]; wears hearing aides.     Hyperlipidemia      Hypertension      Left carotid artery stenosis      Macular degeneration, wet (H)      Osteoporosis      PONV (postoperative nausea and vomiting)      Uterine cancer (H)                Past Surgical History:     Past Surgical History:   Procedure Laterality Date     APPENDECTOMY  1945     carotid artery stenosis        COLONOSCOPY  2012    x2 small polyps; no need for intervention     ENT SURGERY      T&A     GYN SURGERY      hysterectomy     HYSTERECTOMY  1965     THYROIDECTOMY  Left 1994    Partial Thyroidectomy; Patient is not positive on side that was removed              Home Medications:     Prior to Admission medications    Medication Sig Last Dose Taking? Auth Provider   amLODIPine (NORVASC) 10 MG tablet Take 10 mg by mouth daily  12/3/2019 at 1600 Yes Reported, Patient   bevacizumab (AVASTIN) 25 MG/ML injection Inject into the vein See Admin Instructions GETS EVERY 5 WEEKS. (PT UNSURE OF DOSE) OCTOBER Yes Reported, Patient   Glucosamine HCl (GLUCOSAMINE PO) Take 1 tablet by mouth every morning 12/3/2019 at AM Yes Reported, Patient   hypromellose (ARTIFICIAL TEARS) 0.5 % SOLN ophthalmic solution Place 1 drop into both eyes 4 times daily as needed for dry eyes 12/4/2019 at 0500 Yes Reported, Patient   multivitamin (OCUVITE) TABS tablet Take 1 tablet by mouth every morning  12/3/2019 at AM Yes Reported, Patient   multivitamin w/minerals (MULTI-VITAMIN) tablet Take 1 tablet by mouth every morning  12/3/2019 at AM Yes Reported, Patient   naproxen sodium (ANAPROX) 220 MG tablet Take 220 mg by mouth daily as needed for moderate pain MORE THAN A WEEK at PRN Yes Reported, Patient   simvastatin (ZOCOR) 10 MG tablet Take 10 mg by mouth every evening  12/3/2019 at PM Yes Reported, Patient   Vitamin D, Cholecalciferol, 25 MCG (1000 UT) TABS Take 1,000 Units by mouth every morning  12/3/2019 at AM Yes Reported, Patient            Current Medications:           ceFAZolin  1 g Intravenous See Admin Instructions     ceFAZolin  2 g Intravenous Pre-Op/Pre-procedure x 1 dose     fentaNYL  25 mcg Intravenous Pre-Op/Pre-procedure x 1 dose     lidocaine (buffered or not buffered)         Allergies:   No Known Allergies         Social History:   Nova Ordoñez  reports that she has quit smoking. Her smoking use included cigarettes. She  "smoked 0.00 packs per day. She has never used smokeless tobacco. She reports current alcohol use. She reports that she does not use drugs.            Family History:   History reviewed. No pertinent family history.            Review of Systems:   The 10 point Review of Systems is negative other than noted in the HPI.             Physical Exam:   Heart Rate: 73, Blood pressure (!) 155/75, temperature 97.4  F (36.3  C), temperature source Temporal, resp. rate 16, height 1.473 m (4' 10\"), weight 51.8 kg (114 lb 4.8 oz), SpO2 98 %.  114 lbs 4.8 oz    Constitutional:  Negative   Psych:  Normal affect and mood   Neuro:  Alert awake oriented x3 cranial nerves II through XII intact motor function and sensory function is grossly intact   Eyes:  PERRLA EOMI   ENT:  Postsurgical changes on the left side of the neck with the dressing in place   Lymph:  No palpable lymphadenopathy   Cardiovascular:  2-3/6 aortic stenosis murmur heard  Right-sided carotid bruit   Lungs:  Clear   Abdomen:  Abdomen soft , no hepatosplenomegaly   Skin:  NAD except postsurgical changes left side of the neck   Musculoskeletal:  No leg edema   Other:  Palpable peripheral pulses          Data:   All new lab and imaging data was reviewed.    Results for orders placed or performed during the hospital encounter of 12/04/19   Basic metabolic panel     Status: Abnormal   Result Value Ref Range    Sodium 139 133 - 144 mmol/L    Potassium 3.2 (L) 3.4 - 5.3 mmol/L    Chloride 108 94 - 109 mmol/L    Carbon Dioxide 28 20 - 32 mmol/L    Anion Gap 3 3 - 14 mmol/L    Glucose 91 70 - 99 mg/dL    Urea Nitrogen 17 7 - 30 mg/dL    Creatinine 0.88 0.52 - 1.04 mg/dL    GFR Estimate 60 (L) >60 mL/min/[1.73_m2]    GFR Estimate If Black 70 >60 mL/min/[1.73_m2]    Calcium 9.0 8.5 - 10.1 mg/dL   Lipid panel     Status: None   Result Value Ref Range    Cholesterol 150 <200 mg/dL    Triglycerides 66 <150 mg/dL    HDL Cholesterol 68 >49 mg/dL    LDL Cholesterol Calculated 69 " <100 mg/dL    Non HDL Cholesterol 82 <130 mg/dL   Hemoglobin A1c     Status: None   Result Value Ref Range    Hemoglobin A1C 5.4 0 - 5.6 %   EKG 12-lead, tracing only     Status: None (Preliminary result)   Result Value Ref Range    Interpretation ECG Click View Image link to view waveform and result    ABO/Rh type and screen     Status: None   Result Value Ref Range    ABO B     RH(D) Pos     Antibody Screen Neg     Test Valid Only At [facility]        Specimen Expires 12/07/2019         CT ANGIOGRAM OF THE HEAD AND NECK WITHOUT AND WITH CONTRAST   11/11/2019 4:29 PM      HISTORY: History of bilateral carotid artery stenosis on ultrasound  greater than 70%. Carotid stenosis.     TECHNIQUE: Precontrast localizing scans were followed by CT  angiography with an injection of 70mL [id number]-370 IV with scans through  the head and neck. 3D post processing was performed, images were  archived to PACS and used in interpretation of this study. Estimates  of carotid stenoses are made relative to the distal internal carotid  artery diameters except as noted. Radiation dose for this scan was  reduced using automated exposure control, adjustment of the mA and/or  kV according to patient size, or iterative reconstruction technique.     COMPARISON: Ultrasound 10/7/2019     CT HEAD FINDINGS:  No contrast enhancing lesions. There is generalized  atrophy of the brain.  White matter changes are present in the  cerebral hemispheres that are consistent with small vessel ischemic  disease in this age patient.  Cerebral blood flow is grossly normal.     CT ANGIOGRAM HEAD FINDINGS: Mild calcified atherosclerotic plaque in  the carotid siphons Arteries are widely patent with no aneurysm,  significant stenosis, occlusion or intraarterial thrombus. Left  transverse sinus appears chronically occluded.      CT ANGIOGRAM NECK FINDINGS:   Right carotid artery: Tortuous distal cervical internal carotid  artery. Severe calcified and  noncalcified atherosclerotic plaque in  the distal common carotid and proximal internal and external carotid  arteries. Residual luminal diameter is 1.2 mm compared with distal  internal carotid diameter of 5.2 mm indicating 77% diameter stenosis  by NASCET criteria.       Left carotid artery: There is severe calcified atherosclerotic plaque  in the distal common carotid and proximal internal carotid arteries.  Residual luminal diameter is 1.0 mm compared with distal internal  carotid diameter of 5.2 mm indicating 81% diameter stenosis by NASCET  criteria.       Vertebral arteries: No significant stenosis.       Other findings: Right thyroid goiter with left thyroidectomy.                                                                      IMPRESSION:   1. Atherosclerotic plaque causes 77% diameter stenosis of the right  internal carotid artery by NASCET criteria, corresponding with  ultrasound findings.  2. Atherosclerotic plaque causes 81% diameter stenosis of the left  internal carotid artery by NASCET criteria, corresponding with the  ultrasound findings.  3. Mild calcified atherosclerotic plaque in the carotid siphons with  no significant stenosis.  4. 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        HEMANT SANTANA MD    BILATERAL CAROTID ULTRASOUND   10/7/2019 2:22 PM      COMPARISON: None.     HISTORY: Bilateral carotid bruits.     RIGHT CAROTID FINDINGS:  There is calcified and noncalcified  atherosclerotic plaque in the carotid bifurcation.      Right ICA PSV:  542  cm/sec.  Right ICA EDV:  201 cm/sec.  Right ICA/CCA PSV Ratio:  8.0    These indicate greater than 70% diameter stenosis of the right ICA.    Right Vertebral: Antegrade flow.   Right ECA: Antegrade flow.      LEFT CAROTID FINDINGS:  There is calcified and noncalcified  atherosclerotic plaque in the carotid bifurcation.      Left ICA PSV:  502  cm/sec.  Left ICA EDV:  179 cm/sec.  Left ICA/CCA PSV Ratio:  9.1    These indicate greater than  "70% diameter stenosis of the left ICA.    Left Vertebral: Antegrade flow.   Left ECA: Antegrade flow.      Causes of Decreased Accuracy:   None.                                                                       IMPRESSION:    1. Greater than 70% diameter stenosis of the right ICA relative to the  distal ICA diameter.  2. Greater than 70% diameter stenosis of the left ICA relative to the  distal ICA diameter.         Echocardiogram May 13, 2019:    \"Interpretation Summary     Left ventricular systolic function is normal.  The visual ejection fraction is estimated at 65-70%.  The mean AoV pressure gradient is 13mmHg.  Mild to moderate valvular aortic stenosis.  The calculated aortic valve area is 1.4cm2.  Suggest follow up echo in a year to reassess aortic stenosis if clinically  appropriate. The study was technically adequate. There is no comparison study  Available.\"  "

## 2019-12-04 NOTE — ANESTHESIA PROCEDURE NOTES
ARTERIAL LINE PROCEDURE NOTE:   Pre-Procedure    Location: pre-op      Pre-Anesthestic Checklist: patient identified, IV checked, monitors and equipment checked, pre-op evaluation and at physician/surgeon's request    Timeout  Correct Patient: Yes   Correct Procedure: Yes   Correct Site: Yes   Correct Laterality: N/A   Correct Position: Yes   Site Marked: N/A   .   Procedure Documentation  Procedure: arterial line    Supine  Insertion Site:radial, left.Skin infiltrated with 1 mL of 1% lidocaine. Injection technique: Seldinger Technique and Darron's test completed  .  .  Patient Prep/Sterile Barriers; all elements of maximal sterile barrier technique followed, mask, hat, sterile gown, sterile gloves, draped, hand hygiene, chlorhexidine gluconate and isopropyl alcohol    Assessment/Narrative    Catheter: 20 gauge, 1.75 in/4.5 cm quick cath (integral wire)     Secured by other  Tegaderm dressing used.    Arterial waveform: Yes IBP within 10% of NIBP: Yes    Comments:  Arterial line placed for care during surgery

## 2019-12-04 NOTE — ANESTHESIA PREPROCEDURE EVALUATION
Anesthesia Pre-Procedure Evaluation    Patient: Nova Ordoñez   MRN: [id number] : 1935          Preoperative Diagnosis: Carotid stenosis, asymptomatic, bilateral [I65.23]    Procedure(s):  LEFT CAROTID ENDARTERECTOMY WITH EEG    Past Medical History:   Diagnosis Date     Aortic stenosis      Endometrial cancer (H)      [place] (hard of hearing)     [place]il[place]; wears hearing aides.     Hyperlipidemia      Hypertension      Left carotid artery stenosis      Macular degeneration, wet (H)      Osteoporosis      PONV (postoperative nausea and vomiting)      Uterine cancer (H)      Past Surgical History:   Procedure Laterality Date     APPENDECTOMY       carotid artery stenosis       COLONOSCOPY  2012    x2 small polyps; no need for intervention     ENT SURGERY      T&A     GYN SURGERY      hysterectomy     HYSTERECTOMY  1965     THYROIDECTOMY  Left     Patient is not positive on side that was removed       Anesthesia Evaluation     . Pt has had prior anesthetic.     History of anesthetic complications   - PONV        ROS/MED HX    ENT/Pulmonary:      (-) tobacco use, asthma and sleep apnea   Neurologic: Comment: Bilateral carotid stenosis     (-) seizures and CVA   Cardiovascular: Comment: Echo 2019     Left ventricular systolic function is normal.  The visual ejection fraction is estimated at 65-70%.  The mean AoV pressure gradient is 13mmHg.  Mild to moderate valvular aortic stenosis.  The calculated aortic valve area is 1.4cm2.  Suggest follow up echo in a year to reassess aortic stenosis if clinically  appropriate. The study was technically adequate. There is no comparison study  available    (+) hypertension----. : . . . :. valvular problems/murmurs type: AS mild to mod, diagnosed may 2019, f/u echo in one year:.       METS/Exercise Tolerance:     Hematologic:         Musculoskeletal:         GI/Hepatic:        (-) GERD   Renal/Genitourinary:         Endo:      (-) Type II DM and thyroid disease  "  Psychiatric:         Infectious Disease:         Malignancy:         Other:                          Physical Exam      Airway   Mallampati: II  TM distance: >3 FB  Neck ROM: full    Dental   (+) upper dentures and lower dentures    Cardiovascular   Rhythm and rate: regular and normal      Pulmonary    breath sounds clear to auscultation            Lab Results   Component Value Date    WBC 6.4 10/01/2019    HGB 14.6 10/01/2019    HCT 43.1 10/01/2019     10/01/2019     12/04/2019    POTASSIUM 3.2 (L) 12/04/2019    CHLORIDE 108 12/04/2019    CO2 28 12/04/2019    BUN 17 12/04/2019    CR 0.88 12/04/2019    GLC 91 12/04/2019    TERRENCE 9.0 12/04/2019    ALBUMIN 4.1 10/01/2019    PROTTOTAL 7.5 10/01/2019    ALT 29 10/01/2019    AST 22 10/01/2019    ALKPHOS 68 10/01/2019    BILITOTAL 0.3 10/01/2019       Preop Vitals  BP Readings from Last 3 Encounters:   12/04/19 (!) 155/75   11/14/19 128/72   10/01/19 134/84    Pulse Readings from Last 3 Encounters:   11/14/19 91   10/01/19 85      Resp Readings from Last 3 Encounters:   12/04/19 17   11/14/19 16   10/01/19 16    SpO2 Readings from Last 3 Encounters:   12/04/19 95%   11/14/19 95%   10/01/19 96%      Temp Readings from Last 1 Encounters:   12/04/19 36.3  C (97.4  F) (Temporal)    Ht Readings from Last 1 Encounters:   12/04/19 1.473 m (4' 10\")      Wt Readings from Last 1 Encounters:   12/04/19 51.8 kg (114 lb 4.8 oz)    Estimated body mass index is 23.89 kg/m  as calculated from the following:    Height as of this encounter: 1.473 m (4' 10\").    Weight as of this encounter: 51.8 kg (114 lb 4.8 oz).       Anesthesia Plan      History & Physical Review  History and physical reviewed and following examination; no interval change.    ASA Status:  2 .        Plan for General and ETT with Intravenous induction. Maintenance will be Balanced.    PONV prophylaxis:  Ondansetron (or other 5HT-3) and Dexamethasone or Solumedrol  Additional equipment: Arterial Line  "     Postoperative Care  Postoperative pain management:  IV analgesics and Oral pain medications.      Consents  Anesthetic plan, risks, benefits and alternatives discussed with:  Patient.  Use of blood products discussed: Yes.   .                 Linda Siddiqui

## 2019-12-04 NOTE — ANESTHESIA CARE TRANSFER NOTE
Patient: Nova Ordoñez    Procedure(s):  LEFT CAROTID ENDARTERECTOMY WITH EEG    Diagnosis: Carotid stenosis, asymptomatic, bilateral [I65.23]  Diagnosis Additional Information: No value filed.    Anesthesia Type:   General, ETT     Note:  Airway :Face Mask  Patient transferred to:PACU  Comments: Patient awake, extubated, and transferred to PACU. VS stable and report given to RN. Handoff Report: Identifed the Patient, Identified the Reponsible Provider, Reviewed the pertinent medical history, Discussed the surgical course, Reviewed Intra-OP anesthesia mangement and issues during anesthesia, Set expectations for post-procedure period and Allowed opportunity for questions and acknowledgement of understanding      Vitals: (Last set prior to Anesthesia Care Transfer)    CRNA VITALS  12/4/2019 1104 - 12/4/2019 1142      12/4/2019             Pulse:  81    ART BP:  139/51    ART Mean:  89    SpO2:  100 %    Resp Rate (set):  10                Electronically Signed By: ELZA Hamilton CRNA  December 4, 2019  11:42 AM

## 2019-12-04 NOTE — ANESTHESIA POSTPROCEDURE EVALUATION
Patient: Nova Ordoñez    Procedure(s):  LEFT CAROTID ENDARTERECTOMY WITH EEG    Diagnosis:Carotid stenosis, asymptomatic, bilateral [I65.23]  Diagnosis Additional Information: No value filed.    Anesthesia Type:  General, ETT    Note:  Anesthesia Post Evaluation    Patient location during evaluation: PACU  Patient participation: Able to fully participate in evaluation  Level of consciousness: awake  Pain management: adequate  Airway patency: patent  Cardiovascular status: acceptable  Respiratory status: acceptable  Hydration status: acceptable  PONV: none     Anesthetic complications: None          Last vitals:  Vitals:    12/04/19 1250 12/04/19 1300 12/04/19 1310   BP: 138/59 137/70 138/67   Pulse: 78 79 71   Resp: 9 14 10   Temp: 36.4  C (97.6  F)     SpO2: 90% 94% 96%         Electronically Signed By: Linda Siddiqui  December 4, 2019  1:44 PM

## 2019-12-04 NOTE — BRIEF OP NOTE
[facility]    Brief Operative Note    Pre-operative diagnosis: Carotid stenosis, asymptomatic, bilateral [I65.23]  Post-operative diagnosis Same as pre-operative diagnosis    Procedure: Procedure(s):  LEFT CAROTID ENDARTERECTOMY WITH EEG, Patch angioplasty  Surgeon: Surgeon(s) and Role:     * Oscar Marquez MD - Primary     * Peña Escoto PA-C - Assisting  Anesthesia: General   Estimated blood loss: 50 mL  Drains: None  Specimens: * No specimens in log *  Findings:   atherosclerotic plaque at the bifurcation of the left common carotid artery. Patient moved all extremities on command at completion of the surgery  Complications: None.  Implants:   Implant Name Type Inv. Item Serial No.  Lot No. LRB No. Used   GRAFT HEMASHIELD CAROTID 0.8X7.6CM [id number] Graft GRAFT HEMASHIELD CAROTID 0.8X7.6CM [id number] [employer] [id number] Left 1       NEREIDA GuzmanS2  12/4/2019 11:51 AM    Peña Escoto PA-C

## 2019-12-05 VITALS
BODY MASS INDEX: 24.1 KG/M2 | HEIGHT: 58 IN | HEART RATE: 89 BPM | WEIGHT: 114.8 LBS | TEMPERATURE: 98.1 F | OXYGEN SATURATION: 91 % | RESPIRATION RATE: 14 BRPM | SYSTOLIC BLOOD PRESSURE: 113 MMHG | DIASTOLIC BLOOD PRESSURE: 60 MMHG

## 2019-12-05 PROBLEM — I65.22 LEFT CAROTID STENOSIS: Status: ACTIVE | Noted: 2019-12-05

## 2019-12-05 LAB
ANION GAP SERPL CALCULATED.3IONS-SCNC: 7 MMOL/L (ref 3–14)
BUN SERPL-MCNC: 19 MG/DL (ref 7–30)
CALCIUM SERPL-MCNC: 8.5 MG/DL (ref 8.5–10.1)
CHLORIDE SERPL-SCNC: 109 MMOL/L (ref 94–109)
CO2 SERPL-SCNC: 24 MMOL/L (ref 20–32)
CREAT SERPL-MCNC: 0.78 MG/DL (ref 0.52–1.04)
GFR SERPL CREATININE-BSD FRML MDRD: 70 ML/MIN/{1.73_M2}
GLUCOSE SERPL-MCNC: 102 MG/DL (ref 70–99)
POTASSIUM SERPL-SCNC: 3.5 MMOL/L (ref 3.4–5.3)
SODIUM SERPL-SCNC: 140 MMOL/L (ref 133–144)

## 2019-12-05 PROCEDURE — 25000132 ZZH RX MED GY IP 250 OP 250 PS 637: Performed by: PHYSICIAN ASSISTANT

## 2019-12-05 PROCEDURE — 25000132 ZZH RX MED GY IP 250 OP 250 PS 637: Performed by: INTERNAL MEDICINE

## 2019-12-05 PROCEDURE — 99233 SBSQ HOSP IP/OBS HIGH 50: CPT | Performed by: INTERNAL MEDICINE

## 2019-12-05 PROCEDURE — 80048 BASIC METABOLIC PNL TOTAL CA: CPT | Performed by: INTERNAL MEDICINE

## 2019-12-05 PROCEDURE — 36415 COLL VENOUS BLD VENIPUNCTURE: CPT | Performed by: INTERNAL MEDICINE

## 2019-12-05 RX ORDER — ATORVASTATIN CALCIUM 80 MG/1
40 TABLET, FILM COATED ORAL DAILY
Qty: 60 TABLET | Refills: 3 | Status: SHIPPED | OUTPATIENT
Start: 2019-12-05 | End: 2021-04-07

## 2019-12-05 RX ADMIN — ASPIRIN 81 MG: 81 TABLET, DELAYED RELEASE ORAL at 08:59

## 2019-12-05 RX ADMIN — ACETAMINOPHEN 975 MG: 325 TABLET, FILM COATED ORAL at 06:44

## 2019-12-05 RX ADMIN — POTASSIUM CHLORIDE 20 MEQ: 1.5 POWDER, FOR SOLUTION ORAL at 08:59

## 2019-12-05 RX ADMIN — SENNOSIDES AND DOCUSATE SODIUM 2 TABLET: 8.6; 5 TABLET ORAL at 08:59

## 2019-12-05 ASSESSMENT — ACTIVITIES OF DAILY LIVING (ADL)
ADLS_ACUITY_SCORE: 15
ADLS_ACUITY_SCORE: 13
ADLS_ACUITY_SCORE: 15

## 2019-12-05 ASSESSMENT — MIFFLIN-ST. JEOR: SCORE: 865.48

## 2019-12-05 NOTE — PROGRESS NOTES
"Vascular Surgery    No complaints this morning  Patient already requesting to go home.  Denies pain  Denies dyspnea, chest pain  No headache  Walking some, voiding normally.     Gen:  Awake, Alert, NAD, pleasant and conversational   Blood pressure 113/60, pulse 89, temperature 98.1  F (36.7  C), temperature source Oral, resp. rate 14, height 1.473 m (4' 10\"), weight 52.1 kg (114 lb 12.8 oz), SpO2 91 %.  Resp - clear to ascultation.    Cardiac - Regular rate & rhythm + murmur  Neck - Incision healing well, approximated, without signs of infection, no drainage.   Neuro - tongue midline,  strength equal bilaterally.  Ext - Palpable radial pulses bilaterally    A/P high-grade stenosis of left carotid artery, asymptomatic.  s/p left carotid endarterectomy on 12/4/2019.    -Doing well, home today.  -Daily aspirin.  -Follow-up next week in clinic    Peña Escoto PA-C  Office: [phone]  Pager: [phone]    "

## 2019-12-05 NOTE — PLAN OF CARE
A+Ox4 AVSS on room air. LS clear. Tele NSR. Neuros intact. L neck dressing w/ marked drainage. Voiding adequately. Bowels active, flatus+. Scheduled tylenol for pain. Low fat diet. Up SBA

## 2019-12-05 NOTE — PROGRESS NOTES
"SPIRITUAL HEALTH SERVICES Progress Note  [facility]     visited per pt request. Pt is discharging to home and excited to get home and get some rest and \"sleep in [her] own bed.\" Pt had no spiritual/emotional needs at this time.    Branden Zelaya   Intern  "

## 2019-12-05 NOTE — DISCHARGE SUMMARY
Vascular Surgery Discharge Summary    Nova Ordoñez MRN# [id number]   YOB: 1935 Age: 84 year old     Date of Admission:  12/4/2019  Date of Discharge:  12/5/2019  Admitting Physician:  Oscar Marquez MD  Discharging Service:  [facility] Vascular Surgery   Primary Provider:   Dina Ibanez    Discharge Diagnosis:   Principle Diagnosis:  Carotid stenosis, asymptomatic, bilateral [I65.23]    Secondary Diagnosis:  Hyperlipidemia  Hypertension      Procedures:   left carotid endarterectomy with patch    Brief HPI: Nova Ordoñez is a 84 year old year-old female who presented to [facility] for elective left carotid endarterectomy for asymptomatic carotid stenosis.       Hospital Course: Nova Ordoñez was admitted following surgery with high acuity monitoring parameters until post op day 1. There were no new neurologic deficits. Diet was advanced and patient has been tolerating a regular diet. Pain has been well-controlled on oral analgesics. Her hospital course remained uncomplicated and she recovered as anticipated. She will follow-up with Dr. Marquez in one to two weeks and was advised to call with any questions or concerns.     Inpatient Consultations: Vascular Medicine - optimized lipemic control, change to atorvastatin    Labs/Imaging:   Results for orders placed or performed during the hospital encounter of 12/04/19 (from the past 24 hour(s))   Basic metabolic panel   Result Value Ref Range    Sodium 140 133 - 144 mmol/L    Potassium 3.5 3.4 - 5.3 mmol/L    Chloride 109 94 - 109 mmol/L    Carbon Dioxide 24 20 - 32 mmol/L    Anion Gap 7 3 - 14 mmol/L    Glucose 102 (H) 70 - 99 mg/dL    Urea Nitrogen 19 7 - 30 mg/dL    Creatinine 0.78 0.52 - 1.04 mg/dL    GFR Estimate 70 >60 mL/min/[1.73_m2]    GFR Estimate If Black 81 >60 mL/min/[1.73_m2]    Calcium 8.5 8.5 - 10.1 mg/dL       Disposition:   Discharged to home     Discharge Condition  Discharge condition: Stable   Discharge vitals:  "Blood pressure 113/60, pulse 89, temperature 98.1  F (36.7  C), temperature source Oral, resp. rate 14, height 1.473 m (4' 10\"), weight 52.1 kg (114 lb 12.8 oz), SpO2 91 %.     Discharge Medications:      Review of your medicines      START taking      Dose / Directions   aspirin 81 MG EC tablet  Commonly known as:  ASA  Used for:  Carotid stenosis, asymptomatic, right      Dose:  81 mg  Start taking on:  December 6, 2019  Take 1 tablet (81 mg) by mouth daily  Quantity:  30 tablet  Refills:  3     atorvastatin 80 MG tablet  Commonly known as:  LIPITOR  Used for:  Carotid stenosis, asymptomatic, bilateral      Dose:  40 mg  Take 0.5 tablets (40 mg) by mouth daily  Quantity:  60 tablet  Refills:  3        CONTINUE these medicines which have NOT CHANGED      Dose / Directions   amLODIPine 10 MG tablet  Commonly known as:  NORVASC      Dose:  10 mg  Take 10 mg by mouth daily  Refills:  0     bevacizumab 25 MG/ML injection  Commonly known as:  AVASTIN      Inject into the vein See Admin Instructions GETS EVERY 5 WEEKS. (PT UNSURE OF DOSE)  Refills:  0     GLUCOSAMINE PO      Dose:  1 tablet  Take 1 tablet by mouth every morning  Refills:  0     hypromellose 0.5 % Soln ophthalmic solution  Commonly known as:  ARTIFICIAL TEARS      Dose:  1 drop  Place 1 drop into both eyes 4 times daily as needed for dry eyes  Refills:  0     * multivitamin Tabs tablet      Dose:  1 tablet  Take 1 tablet by mouth every morning  Refills:  0     * Multi-vitamin tablet      Dose:  1 tablet  Take 1 tablet by mouth every morning  Refills:  0     naproxen sodium 220 MG tablet  Commonly known as:  ANAPROX      Dose:  220 mg  Take 220 mg by mouth daily as needed for moderate pain  Refills:  0     Vitamin D (Cholecalciferol) 25 MCG (1000 UT) Tabs      Dose:  1,000 Units  Take 1,000 Units by mouth every morning  Refills:  0         * This list has 2 medication(s) that are the same as other medications prescribed for you. Read the directions " carefully, and ask your doctor or other care provider to review them with you.            STOP taking    simvastatin 10 MG tablet  Commonly known as:  ZOCOR              Where to get your medicines      These medications were sent to [facility] [place], [place] - [address] [facility]  [address] [facility] [address]    Phone:  [phone]     aspirin 81 MG EC tablet    atorvastatin 80 MG tablet           Discharge Instructions:   Follow-up Appointments     Follow-up and recommended labs and tests       Follow up with Dr. Marquez at the [facility] in 1-2 weeks.  Call [phone] with any questions or to schedule   an appointment.           After Care Instructions     Activity      Your activity upon discharge: No driving for one week or while taking narcotic pain medication. No heavy lifting, pulling or pushing for 2 weeks. You may shower beginning 2 days after surgery.  No tub bathing, swimming, or submersion in water.         Diet      Follow this diet upon discharge: Low saturated Fat, Low Cholesterol diet.         Discharge Instructions      Follow up  in 2 weeks with Dr. Marquez or Peña Escoto PA-C at the [facility].  Call [phone] with any questions or to schedule an appointment.         Wound care and dressings      Instructions to care for your wound at home: Gauze and tape may be applied for comfort to cover your incision(s).                NEREIDA GuzmanS2  12/5/2019 10:10 AM      Patient seen concurrently with student.   Information in note reviewed in detail for accuracy.    Peña Escoto PA-C   Office: [phone]

## 2019-12-05 NOTE — PLAN OF CARE
VSS. AOX4. Neuros intact. CMS intact. POD #1. Independent up to bathroom and ambulating. Voiding adequately. Tolerating diet and liquids. Dressing intact, steristrips in place. Drainage dried, no change in drainage. Incentive spirometer in use. Ready for discharge. Discharge instructions provided and questions answered. Med administration instructions provided and will be sent home with patient.  Patient to be discharged to home, son will be picking up pt.

## 2019-12-05 NOTE — PROGRESS NOTES
[facility]    Vascular Medicine Progress Note    Date of Service (when I saw the patient): 12/05/2019     Assessment & Plan   Nova Ordoñez is a 84 year old female who was admitted on 12/4/2019.       1.  Bilateral asymptomatic high-grade carotid stenosis (81%LICA stenosis and 77%TITO stenosis)  S/p left carotid endarterectomy on December 4, 2019     POD 1  No neuro deficits  Tolerated food  Ambulating  Voided  Hemodynamically stable  Surgical site looks good  Changed low-dose simvastatin to atorvastatin 40 mg daily this admission and discharge on this medication  Continue rest of the outpatient medications same  Follow-up with Dr. Marquez for postop check and follow-up with Dr. Hernandez as scheduled    Patient care time spent today 35 minutes     2.  Hyperlipidemia:  She is been taking low-dose simvastatin 10 mg daily LDL is less than 70 but given bilateral carotid stenosis underwent carotid endarterectomy she will benefit with more potent statin will switch to atorvastatin 40 mg daily     3.  Hypertension;  Well-controlled with current medications continue the same outpatient dose of amlodipine     4.  Aortic stenosis: (Echocardiogram May 13, 2019)  Normal LV function, meanAoV pressure gradient is 13 mmHg  Mild to moderate aortic stenosis with valve area is 1.4 cm      She will need repeat echocardiogram in May 2020  Optimize risk factors, treated with more potent statin.     Total patient care time spent with 35 minutes      Reyna Alexandre MD,LEONARDO,[facility]  Vascular Medicine    Interval History   Doing well  Blood pressure well controlled  No neuro deficits  Surgical site looks good  Potassium normal this morning    Physical Exam   Temp: 98.1  F (36.7  C) Temp src: Oral BP: 113/60 Pulse: 89 Heart Rate: 83 Resp: 14 SpO2: 91 % O2 Device: None (Room air) Oxygen Delivery: 2 LPM  Vitals:    12/04/19 0711 12/05/19 0338   Weight: 51.8 kg (114 lb 4.8 oz) 52.1 kg (114 lb 12.8 oz)     Vital Signs with  Ranges  Temp:  [97.5  F (36.4  C)-98.4  F (36.9  C)] 98.1  F (36.7  C)  Pulse:  [62-93] 89  Heart Rate:  [64-96] 83  Resp:  [9-20] 14  BP: ()/(47-73) 113/60  MAP:  [82 mmHg-93 mmHg] 82 mmHg  Arterial Line BP: (118-148)/(58-61) 118/61  SpO2:  [90 %-100 %] 91 %  I/O last 3 completed shifts:  In: 2344.5 [P.O.:720; I.V.:1624.5]  Out: 1000 [Urine:950; Blood:50]    Constitutional: Awake, alert, cooperative, no apparent distress, and appears stated age.  Eyes: Lids and lashes normal, pupils equal, round and reactive to light, extra ocular muscles intact, sclera clear, conjunctiva normal.  ENT: Surgical site looks good, right carotid bruit present and unchanged  Respiratory: No increased work of breathing, good air exchange, clear to auscultation bilaterally, no crackles or wheezing.  Cardiovascular: Normal apical impulse, regular rate and rhythm, normal S1 and S2, no S3 or S4, and no murmur noted.  GI:  normal bowel sounds, soft, non-distended, non-tender, no masses palpated, no hepatosplenomegaly.  Lymph/Hematologic: No cervical lymphadenopathy and no supraclavicular lymphadenopathy  Skin: No bruising or bleeding, normal skin color, texture, turgor, no redness, warmth, or swelling, no rashes, no lesions,  nails normal without discoloration or clubbing and no jaundice.  Musculoskeletal: There is no redness, warmth, or swelling of the joints.  Full range of motion noted.  Motor strength is 5 out of 5 all extremities bilaterally.  Tone is normal.  Neurologic: Awake, alert, oriented to name, place and time.  Cranial nerves II-XII are grossly intact.  Motor is 5 out of 5 bilaterally.   Neuropsychiatric: Calm, normal eye contact, alert, normal affect, oriented to self, place, time and situation, memory for past and recent events intact and thought process normal.  Pulses: Good pulses    Medications     nitroPRUsside (NIPRIDE) IV infusion ADULT/PEDS GREATER than or EQUAL to 45 kg std conc       sodium chloride Stopped  (12/04/19 1558)       acetaminophen  975 mg Oral Q8H ZANA     amLODIPine  10 mg Oral QPM     aspirin  81 mg Oral Daily     atorvastatin  40 mg Oral QPM     famotidine  20 mg Intravenous Q24H    Or     famotidine  20 mg Oral Q24H     potassium chloride  20 mEq Oral Daily     senna-docusate  1 tablet Oral BID    Or     senna-docusate  2 tablet Oral BID     sodium chloride (PF)  3 mL Intracatheter Q8H       Data   Recent Labs   Lab 12/05/19  0739 12/04/19  0721    139   POTASSIUM 3.5 3.2*   CHLORIDE 109 108   CO2 24 28   BUN 19 17   CR 0.78 0.88   ANIONGAP 7 3   TERRENCE 8.5 9.0   * 91

## 2019-12-05 NOTE — OP NOTE
Procedure Date: 12/04/2019      PREOPERATIVE DIAGNOSIS:  Bilateral internal carotid artery stenosis.      POSTOPERATIVE DIAGNOSIS:  Bilateral internal carotid artery stenosis.      PROCEDURE:  Left carotid thromboendarterectomy with Dacron patch angioplasty.      SURGEON:  Oscar Marquez III, MD      ASSISTANT:  Peña Escoot PA-C      DESCRIPTION OF PROCEDURE:  Under satisfactory general endotracheal anesthesia, the patient's left neck was prepped and draped in routine sterile fashion.  After performing appropriate timeout, a primary longitudinal incision was made along the anterior border of the sternocleidomastoid muscle and the platysma was divided using electrocautery.  Dissection was sharply carried down to the carotid sheath at the level of the omohyoid muscle.  The carotid sheath was sharply opened.  The common carotid artery was isolated using sharp and blunt dissection and encircled with a vessel loop.  Dissection was then carried up along the anterior surface of the carotid artery to its bifurcation.  Venous tributaries to internal jugular system were clamped, divided and ligated with interrupted ties of 2-0 and 3-0 silk.  Superior thyroid and external carotid arteries were isolated using sharp and blunt dissection and encircled with vessel loops.  The internal carotid artery was then freed up almost to the base of the skull, again using sharp and blunt dissection.  The hypoglossal nerve was identified and preserved.  The patient was systemically heparinized with 4000 units of IV heparin.  The internal carotid, external carotid and common carotid arteries were occluded.  The common carotid artery was opened in a longitudinal arteriotomy.  The arteriotomy was carried up onto the internal carotid artery for approximately 3.5 cm.  There was a very high-grade, I would estimate at almost 90%, calcified stenosis present in the proximal internal carotid artery.  A Sundt shunt was then placed in the usual  fashion and flow was restored to the left cerebral hemisphere.  Atheromatous plaque was endarterectomized to the level of the common carotid artery and transected.  The endarterectomy was then carried up to the level of the carotid bifurcation.  External carotid artery was endarterectomized using the eversion technique over approximately 7-8 mm and the plaque was removed.  The endarterectomy was then carried up along the internal carotid artery that began to thin out posteriorly.  The plaque came out with a nicely feathered distal edge.  There was 1 short segment of intimal flap left distally, which was tacked down with interrupted suture of 7-0 Prolene.  The endarterectomized segment was thoroughly washed with dilute heparinized saline solution and all loose debris was removed.  The arteriotomy was then closed with a Dacron patch angioplasty.  This was cut to size and fixed in place circumferentially in the internal carotid artery with a running suture of 7-0 Prolene and in the common carotid artery with a running suture of 6-0 Prolene.  Prior to completion of the patch angioplasty, the Sundt shunt was removed.  The internal carotid, external carotid and common carotid arteries were all flushed.  The endarterectomized segment was again washed with dilute heparinized saline solution until clear.  Closure of the patch angioplasty was then completed.  Flow was then restored up through the external carotid artery for several beats and up through the internal carotid artery.  There was one area of leak along the patch angioplasty distally which was controlled with a single suture ligature of 7-0 Prolene.  The wound was then thoroughly irrigated out with saline solution and checked for hemostasis.  After assuring good hemostasis, the wound was closed in layers with interrupted sutures of 3-0 Vicryl.  Skin was closed with 4-0 subcuticular Vicryl stitch.  Sponge and needle counts were correct at the termination of the  procedure.  The patient tolerated the procedure well and was taken to the postanesthetic recovery room in satisfactory condition.  She is moving all extremities actively to command.  She did undergo intraoperative EEG monitoring throughout the case and no significant changes were noted.         MED ROJAS III, MD             D: 2019   T: 2019   MT: ALISON      Name:     SHYAM LIANG   MRN:      [id number]        Account:        [id number]   :      1935           Procedure Date: 2019      Document: [id number]

## 2019-12-06 LAB — INTERPRETATION ECG - MUSE: NORMAL

## 2019-12-12 ENCOUNTER — OFFICE VISIT (OUTPATIENT)
Dept: SURGERY | Facility: CLINIC | Age: 84
End: 2019-12-12
Payer: COMMERCIAL

## 2019-12-12 VITALS
OXYGEN SATURATION: 98 % | BODY MASS INDEX: 24.1 KG/M2 | HEIGHT: 58 IN | HEART RATE: 94 BPM | DIASTOLIC BLOOD PRESSURE: 74 MMHG | WEIGHT: 114.8 LBS | SYSTOLIC BLOOD PRESSURE: 136 MMHG | RESPIRATION RATE: 16 BRPM

## 2019-12-12 DIAGNOSIS — Z09 SURGICAL FOLLOWUP VISIT: Primary | ICD-10-CM

## 2019-12-12 DIAGNOSIS — Z09 FOLLOW-UP EXAMINATION FOLLOWING SURGERY: ICD-10-CM

## 2019-12-12 PROCEDURE — 99024 POSTOP FOLLOW-UP VISIT: CPT | Performed by: SURGERY

## 2019-12-12 RX ORDER — ATORVASTATIN CALCIUM 20 MG/1
20 TABLET, FILM COATED ORAL DAILY
Qty: 90 TABLET | Refills: 0 | Status: SHIPPED | OUTPATIENT
Start: 2019-12-12 | End: 2021-04-07

## 2019-12-12 ASSESSMENT — MIFFLIN-ST. JEOR: SCORE: 865.48

## 2019-12-12 NOTE — PROGRESS NOTES
Doing well no  Complaints.  Wound healing nicely.  Neurologically intact.  Right  Side was 77% stenosis on CTA.  I have told her she will likely come to endarterectomy  On that side as well.  She wishes to observe for now.  Will check an ultrasound in  3 months discussed.

## 2019-12-20 DIAGNOSIS — I65.22 LEFT CAROTID STENOSIS: Primary | ICD-10-CM

## 2020-08-24 ENCOUNTER — HOSPITAL ENCOUNTER (OUTPATIENT)
Dept: ULTRASOUND IMAGING | Facility: CLINIC | Age: 85
Discharge: HOME | End: 2020-08-24
Attending: SURGERY | Admitting: SURGERY
Payer: COMMERCIAL

## 2020-08-24 DIAGNOSIS — I65.22 LEFT CAROTID STENOSIS: ICD-10-CM

## 2020-08-24 PROCEDURE — 93882 EXTRACRANIAL UNI/LTD STUDY: CPT | Mod: LT

## 2020-08-26 ENCOUNTER — OFFICE VISIT (OUTPATIENT)
Dept: SURGERY | Facility: CLINIC | Age: 85
End: 2020-08-26
Attending: SURGERY
Payer: COMMERCIAL

## 2020-08-26 VITALS
HEART RATE: 97 BPM | SYSTOLIC BLOOD PRESSURE: 118 MMHG | WEIGHT: 108 LBS | HEIGHT: 58 IN | RESPIRATION RATE: 12 BRPM | OXYGEN SATURATION: 96 % | BODY MASS INDEX: 22.67 KG/M2 | DIASTOLIC BLOOD PRESSURE: 70 MMHG

## 2020-08-26 DIAGNOSIS — I65.21 ASYMPTOMATIC STENOSIS OF RIGHT CAROTID ARTERY: ICD-10-CM

## 2020-08-26 DIAGNOSIS — Z98.890 HISTORY OF LEFT-SIDED CAROTID ENDARTERECTOMY: Primary | ICD-10-CM

## 2020-08-26 DIAGNOSIS — I73.9 PERIPHERAL VASCULAR DISEASE WITH CLAUDICATION (H): ICD-10-CM

## 2020-08-26 PROCEDURE — 99213 OFFICE O/P EST LOW 20 MIN: CPT | Performed by: SURGERY

## 2020-08-26 ASSESSMENT — MIFFLIN-ST. JEOR: SCORE: 829.63

## 2020-08-26 NOTE — PROGRESS NOTES
"Nova Ordoñez is an 85-year-old female with hypertension and hyperlipidemia who is status post left carotid endarterectomy performed by Dr. Marquez in December 2019 for an asymptomatic left carotid stenosis.  She is also followed for a 77% asymptomatic right carotid stenosis.  She presents today for ultrasound follow-up of the left carotid artery.  She denies swallowing difficulties, change in her voice, or neurologic focality.  Over the past 8 months she denies stroke, symptoms of TIA, or amaurosis.  She remains a stable claudicator but \"still gets her miles in.\"  She is right-handed.    Exam:  Well-developed female alert and oriented x3.  Blood pressure 118/70 with a pulse of 97.  Well-healed left-sided neck incision.  Neurologic exam nonfocal.  Radial pulses 2+ palpable bilaterally.    Imaging:  BILATERAL CAROTID ULTRASOUND   8/24/2020 11:51 AM      HISTORY:  History of left carotid endarterectomy on 12/4/2019. Left  carotid stenosis.     COMPARISON: October 7, 2019     LEFT CAROTID FINDINGS:  There is minimal, if any, atherosclerotic  plaque at the carotid bulb and proximal internal carotid artery,  including intimal hyperplasia.  Left ICA PSV:  96  cm/sec.  Left ICA EDV:  30 cm/sec.  Left ICA/CCA PSV Ratio:  1.0    These indicate less than 50% diameter stenosis of the left ICA.    Left Vertebral: Antegrade flow.   Left ECA: Antegrade flow.      Causes of Decreased Accuracy:   None.                                                                       IMPRESSION:    1. Less than 50% diameter stenosis of the left ICA relative to the  distal ICA diameter.     EYAL LACEY MD      ASSESSMENT:  1.  Status post left carotid endarterectomy December 2019 clinically doing well with widely patent left carotid artery.    2.  Asymptomatic 77% right carotid stenosis.    3.  Lower extremity peripheral arterial disease with stable claudication.    RECOMMENDATION:  I had a nice discussion with Nova reviewing all the " above.  We discussed potential timing of a prophylactic right carotid endarterectomy.  She has discussed this with her son and is quite adamant that she would like to put this off until the spring 2021.  She understands that by waiting until then she is at slight risk for a right hemispheric event.  She remains steadfast in her desire to wait until the spring 2021 before considering right carotid endarterectomy.  She will continue her medical regimen including daily aspirin.  Vascular surgical follow-up will be with me in April 2021 for a right carotid ultrasound.  At that time she may consider right carotid endarterectomy.    Total face-to-face time was 15 minutes, greater than 50% spent providing counseling and education.    Brad Dangelo MD

## 2020-08-27 DIAGNOSIS — I65.21 CAROTID STENOSIS, ASYMPTOMATIC, RIGHT: Primary | ICD-10-CM

## 2021-04-06 ENCOUNTER — HOSPITAL ENCOUNTER (OUTPATIENT)
Dept: ULTRASOUND IMAGING | Facility: CLINIC | Age: 86
Discharge: HOME | End: 2021-04-06
Attending: SURGERY | Admitting: SURGERY
Payer: COMMERCIAL

## 2021-04-06 DIAGNOSIS — I65.21 CAROTID STENOSIS, ASYMPTOMATIC, RIGHT: ICD-10-CM

## 2021-04-06 PROCEDURE — 93882 EXTRACRANIAL UNI/LTD STUDY: CPT | Mod: RT

## 2021-04-07 ENCOUNTER — OFFICE VISIT (OUTPATIENT)
Dept: SURGERY | Facility: CLINIC | Age: 86
End: 2021-04-07
Payer: COMMERCIAL

## 2021-04-07 VITALS
SYSTOLIC BLOOD PRESSURE: 126 MMHG | DIASTOLIC BLOOD PRESSURE: 88 MMHG | BODY MASS INDEX: 22.67 KG/M2 | HEART RATE: 95 BPM | RESPIRATION RATE: 16 BRPM | WEIGHT: 108 LBS | OXYGEN SATURATION: 95 % | HEIGHT: 58 IN

## 2021-04-07 DIAGNOSIS — I65.21 ASYMPTOMATIC STENOSIS OF RIGHT CAROTID ARTERY: ICD-10-CM

## 2021-04-07 DIAGNOSIS — Z98.890 HISTORY OF LEFT-SIDED CAROTID ENDARTERECTOMY: Primary | ICD-10-CM

## 2021-04-07 PROCEDURE — 99214 OFFICE O/P EST MOD 30 MIN: CPT | Performed by: SURGERY

## 2021-04-07 RX ORDER — SIMVASTATIN 10 MG
10 TABLET ORAL EVERY EVENING
Status: ON HOLD | COMMUNITY
Start: 2021-03-13 | End: 2021-04-30

## 2021-04-07 RX ORDER — CALCIUM CARBONATE/VITAMIN D3 500-10/5ML
1 LIQUID (ML) ORAL EVERY MORNING
COMMUNITY

## 2021-04-07 ASSESSMENT — MIFFLIN-ST. JEOR: SCORE: 824.63

## 2021-04-07 NOTE — PROGRESS NOTES
Nova Ordoñez is an 86-year-old female with hypertension and hyperlipidemia who is status post left carotid endarterectomy performed by Dr. Marquez in December 2019 for an asymptomatic high-grade left carotid stenosis.  She has been followed for an approximately 80% asymptomatic right carotid stenosis.  At her prior visit last August she was clinically doing well.  She was adamant in postponing any potential right carotid endarterectomy until the spring 2021.  She presents now with her son for a follow-up right carotid ultrasound and a discussion as to her treatment options.    Over the past year she denies stroke, symptoms of TIA, or amaurosis.  She lives independently.  She drives only on a very limited basis due to declining vision secondary to macular degeneration.  She otherwise considers her health to be quite good.    Exam:  Pleasant female alert and oriented x3.  Blood pressure 126/88 with a pulse of 95.  She is 4 feet 10 inches tall and weighs 108 pounds.  Well-healed left carotid endarterectomy incision.  Neurologic exam nonfocal.  2+ radial pulses bilaterally.    Imaging:  BILATERAL CAROTID ULTRASOUND   4/6/2021 11:39 AM      HISTORY: History of right carotid artery stenosis; Carotid stenosis,  asymptomatic, right.     COMPARISON: 10/7/2019     RIGHT CAROTID FINDINGS:  There is moderate to severe mixed  atherosclerotic plaque at the carotid bifurcation and proximal  internal carotid artery.  Right ICA PSV:  493 cm/sec.  Right ICA EDV:  63 cm/sec.  Right ICA/CCA PSV Ratio:  6.5    These indicate greater than 70% diameter stenosis of the right ICA.    Right Vertebral: Antegrade flow.   Right ECA: Antegrade flow. Significantly elevated velocity is noted  measuring 425/63 cm/second.     LEFT CAROTID FINDINGS:  Only the right carotid arterial system imaged  during today's exam.     Causes of Decreased Accuracy: None.                                                                       IMPRESSION:    1.  Greater than 70% diameter stenosis of the right ICA relative to the  distal ICA diameter.  2. Significant velocity elevation at the external carotid artery, as  well, measuring 425/63 cm/second, similar to previous exam.     EYAL LACEY MD      ASSESSMENT:  1.  Asymptomatic approximately 80% right carotid stenosis.    2.  Status post uncomplicated left carotid endarterectomy in December 2019-clinically doing well.    RECOMMENDATION:  I reviewed all the above with Nova and her son.  I do recommend right carotid endarterectomy.  She is now ready to proceed with that surgery.  She is familiar with the details of the procedure and the anticipated postoperative course.  She may continue her aspirin uninterrupted.  Right carotid endarterectomy will be scheduled in a timely manner at [facility].    Total length of this encounter was 30 minutes.    Brad Dangelo MD

## 2021-04-09 DIAGNOSIS — I65.21 CAROTID STENOSIS, RIGHT: Primary | ICD-10-CM

## 2021-04-12 ENCOUNTER — TELEPHONE (OUTPATIENT)
Dept: OTHER | Facility: CLINIC | Age: 86
End: 2021-04-12

## 2021-04-12 PROBLEM — I65.21 CAROTID STENOSIS, RIGHT: Status: ACTIVE | Noted: 2021-04-12

## 2021-04-12 NOTE — TELEPHONE ENCOUNTER
Type of surgery: RIGHT CAROTID ENDARTERECTOMY WITH ELECTROENCEPHALOGRAM   Location of surgery: [facility]  Date and time of surgery: 4/29/2021 11:05AM  Surgeon: DR. ALEGRIA  Pre-Op Appt Date: TO BE SCHEDULED BY PATIENT  Post-Op Appt Date: 5/12/2021   Packet sent out: MAILED 4/12/2021 EDA  Pre-cert/Authorization completed:  Yes  Date: 4/12/2021 EDA

## 2021-04-12 NOTE — TELEPHONE ENCOUNTER
I called Nova and discussed the below. She verbalized understanding and will be calling her PCP to schedule a pre-op.     Patient Education    Procedure: Right carotid endarterectomy with EEG  Diagnosis: Right carotid artery stenosis  Anticoagulation Instruction: Continue Aspirin.   Pre-Operative Physical Exam: You need to have a pre-op physical exam within 30 days of your procedure. Your procedure may be cancelled if you do not have a current History and Physical. Call your PCP's office to schedule.  Allergies:  Updated in Epic  Bowel Prep: None   NPO per protocol.   Post Procedure Education: [facility] patient post-procedure fact sheet reviewed with patient.    COVID-19 instructions for isolation and pre testing reviewed with pt.    Learner(s):patient  Method: Listening  Barriers to Learning:No Barrier  Outcome: Patient did verbalize understanding of above education.    America IVORYN, RN    [facility]  Vascular [facility]  Office: [phone]  Fax: [phone]

## 2021-04-18 DIAGNOSIS — Z11.59 ENCOUNTER FOR SCREENING FOR OTHER VIRAL DISEASES: ICD-10-CM

## 2021-04-27 DIAGNOSIS — Z11.59 ENCOUNTER FOR SCREENING FOR OTHER VIRAL DISEASES: ICD-10-CM

## 2021-04-27 LAB
SARS-COV-2 RNA RESP QL NAA+PROBE: NORMAL
SPECIMEN SOURCE: NORMAL

## 2021-04-27 PROCEDURE — 99207 PR NO CHARGE LOS: CPT

## 2021-04-27 PROCEDURE — U0003 INFECTIOUS AGENT DETECTION BY NUCLEIC ACID (DNA OR RNA); SEVERE ACUTE RESPIRATORY SYNDROME CORONAVIRUS 2 (SARS-COV-2) (CORONAVIRUS DISEASE [COVID-19]), AMPLIFIED PROBE TECHNIQUE, MAKING USE OF HIGH THROUGHPUT TECHNOLOGIES AS DESCRIBED BY CMS-2020-01-R: HCPCS | Performed by: SURGERY

## 2021-04-27 PROCEDURE — U0005 INFEC AGEN DETEC AMPLI PROBE: HCPCS | Performed by: SURGERY

## 2021-04-28 ENCOUNTER — ANESTHESIA EVENT (OUTPATIENT)
Dept: SURGERY | Facility: CLINIC | Age: 86
DRG: 039 | End: 2021-04-28
Payer: COMMERCIAL

## 2021-04-28 LAB
LABORATORY COMMENT REPORT: NORMAL
SARS-COV-2 RNA RESP QL NAA+PROBE: NEGATIVE
SPECIMEN SOURCE: NORMAL

## 2021-04-28 NOTE — PROGRESS NOTES
PTA medications updated by Medication Scribe prior to surgery via phone call with patient        Comments:    Medication history sources: Patient, Surescripts, H&P and Patient's home med list  In the past week, patient estimated taking medication this percent of the time: Greater than 90%  Adherence assessment: N/A Not Observed    Significant changes made to the medication list:  None      Additional medication history information:   Patient brought own home meds: Artifical Tears    The information provided in this note is only as accurate as the sources available at the time of update(s)      Prior to Admission medications    Medication Sig Last Dose Taking? Auth Provider   amLODIPine (NORVASC) 10 MG tablet Take 10 mg by mouth every evening   at PM Yes Reported, Patient   aspirin (ASA) 81 MG EC tablet Take 1 tablet (81 mg) by mouth daily  Patient taking differently: Take 81 mg by mouth every morning   at AM Yes Peña Escoto PA-C   bevacizumab (AVASTIN) 25 MG/ML injection Inject 1.25 mg into the vein See Admin Instructions GETS EVERY 5 WEEKS. Next appointment Monday, May 24, 2021  Yes Reported, Patient   hypromellose (ARTIFICIAL TEARS) 0.5 % SOLN ophthalmic solution Place 1 drop into both eyes 4 times daily as needed for dry eyes  at PRN Yes Reported, Patient   magnesium oxide 400 MG CAPS Take 1 capsule by mouth every morning  4/27/2021 at AM Yes Reported, Patient   multivitamin (OCUVITE) TABS tablet Take 1 tablet by mouth every morning  4/27/2021 at AM Yes Reported, Patient   multivitamin w/minerals (MULTI-VITAMIN) tablet Take 1 tablet by mouth every morning   at AM Yes Reported, Patient   naproxen sodium (ANAPROX) 220 MG tablet Take 220 mg by mouth 2 times daily as needed for moderate pain  more than 2 weeks Yes Reported, Patient   simvastatin (ZOCOR) 10 MG tablet Take 10 mg by mouth every evening  4/27/2021 at PM Yes Reported, Patient   Vitamin D, Cholecalciferol, 25 MCG (1000 UT) TABS Take 1,000 Units by  mouth every evening  4/27/2021 at PM Yes Reported, Patient       Jung Montoya, [facility]  Medication [facility]

## 2021-04-29 ENCOUNTER — APPOINTMENT (OUTPATIENT)
Dept: SURGERY | Facility: PHYSICIAN GROUP | Age: 86
End: 2021-04-29
Payer: COMMERCIAL

## 2021-04-29 ENCOUNTER — HOSPITAL ENCOUNTER (INPATIENT)
Facility: CLINIC | Age: 86
LOS: 1 days | Discharge: HOME | DRG: 039 | End: 2021-04-30
Attending: SURGERY | Admitting: SURGERY
Payer: COMMERCIAL

## 2021-04-29 ENCOUNTER — ANESTHESIA (OUTPATIENT)
Dept: SURGERY | Facility: CLINIC | Age: 86
DRG: 039 | End: 2021-04-29
Payer: COMMERCIAL

## 2021-04-29 DIAGNOSIS — I65.21 CAROTID STENOSIS, RIGHT: ICD-10-CM

## 2021-04-29 DIAGNOSIS — Z79.2 NEED FOR PROPHYLACTIC ANTIBIOTIC: Primary | ICD-10-CM

## 2021-04-29 LAB
ABO + RH BLD: NORMAL
ABO + RH BLD: NORMAL
ANION GAP SERPL CALCULATED.3IONS-SCNC: 6 MMOL/L (ref 3–14)
BLD GP AB SCN SERPL QL: NORMAL
BLOOD BANK CMNT PATIENT-IMP: NORMAL
BUN SERPL-MCNC: 19 MG/DL (ref 7–30)
CALCIUM SERPL-MCNC: 8.7 MG/DL (ref 8.5–10.1)
CHLORIDE SERPL-SCNC: 109 MMOL/L (ref 94–109)
CHOLEST SERPL-MCNC: 180 MG/DL
CO2 SERPL-SCNC: 27 MMOL/L (ref 20–32)
CREAT SERPL-MCNC: 0.84 MG/DL (ref 0.52–1.04)
GFR SERPL CREATININE-BSD FRML MDRD: 63 ML/MIN/{1.73_M2}
GLUCOSE SERPL-MCNC: 92 MG/DL (ref 70–99)
HBA1C MFR BLD: 5.2 % (ref 0–5.6)
HDLC SERPL-MCNC: 73 MG/DL
LDLC SERPL CALC-MCNC: 93 MG/DL
NONHDLC SERPL-MCNC: 107 MG/DL
PLATELET # BLD AUTO: 194 10E9/L (ref 150–450)
POTASSIUM SERPL-SCNC: 3.4 MMOL/L (ref 3.4–5.3)
SODIUM SERPL-SCNC: 142 MMOL/L (ref 133–144)
SPECIMEN EXP DATE BLD: NORMAL
TRIGL SERPL-MCNC: 72 MG/DL

## 2021-04-29 PROCEDURE — 250N000011 HC RX IP 250 OP 636: Performed by: SURGERY

## 2021-04-29 PROCEDURE — 258N000003 HC RX IP 258 OP 636: Performed by: NURSE ANESTHETIST, CERTIFIED REGISTERED

## 2021-04-29 PROCEDURE — 250N000011 HC RX IP 250 OP 636: Performed by: NURSE ANESTHETIST, CERTIFIED REGISTERED

## 2021-04-29 PROCEDURE — 120N000001 HC R&B MED SURG/OB

## 2021-04-29 PROCEDURE — 250N000009 HC RX 250: Performed by: NURSE ANESTHETIST, CERTIFIED REGISTERED

## 2021-04-29 PROCEDURE — 99223 1ST HOSP IP/OBS HIGH 75: CPT | Performed by: INTERNAL MEDICINE

## 2021-04-29 PROCEDURE — 03CM0ZZ EXTIRPATION OF MATTER FROM RIGHT EXTERNAL CAROTID ARTERY, OPEN APPROACH: ICD-10-PCS | Performed by: SURGERY

## 2021-04-29 PROCEDURE — 85049 AUTOMATED PLATELET COUNT: CPT | Performed by: SURGERY

## 2021-04-29 PROCEDURE — 250N000013 HC RX MED GY IP 250 OP 250 PS 637: Performed by: STUDENT IN AN ORGANIZED HEALTH CARE EDUCATION/TRAINING PROGRAM

## 2021-04-29 PROCEDURE — 86901 BLOOD TYPING SEROLOGIC RH(D): CPT | Performed by: SURGERY

## 2021-04-29 PROCEDURE — 999N000141 HC STATISTIC PRE-PROCEDURE NURSING ASSESSMENT: Performed by: SURGERY

## 2021-04-29 PROCEDURE — 80048 BASIC METABOLIC PNL TOTAL CA: CPT | Performed by: SURGERY

## 2021-04-29 PROCEDURE — C1763 CONN TISS, NON-HUMAN: HCPCS | Performed by: SURGERY

## 2021-04-29 PROCEDURE — 83036 HEMOGLOBIN GLYCOSYLATED A1C: CPT | Performed by: SURGERY

## 2021-04-29 PROCEDURE — 250N000025 HC SEVOFLURANE, PER MIN: Performed by: SURGERY

## 2021-04-29 PROCEDURE — 86900 BLOOD TYPING SEROLOGIC ABO: CPT | Performed by: SURGERY

## 2021-04-29 PROCEDURE — 86850 RBC ANTIBODY SCREEN: CPT | Performed by: SURGERY

## 2021-04-29 PROCEDURE — 360N000077 HC SURGERY LEVEL 4, PER MIN: Performed by: SURGERY

## 2021-04-29 PROCEDURE — 258N000003 HC RX IP 258 OP 636: Performed by: ANESTHESIOLOGY

## 2021-04-29 PROCEDURE — 250N000009 HC RX 250: Performed by: SURGERY

## 2021-04-29 PROCEDURE — 272N000001 HC OR GENERAL SUPPLY STERILE: Performed by: SURGERY

## 2021-04-29 PROCEDURE — 250N000013 HC RX MED GY IP 250 OP 250 PS 637: Performed by: PHYSICIAN ASSISTANT

## 2021-04-29 PROCEDURE — 710N000009 HC RECOVERY PHASE 1, LEVEL 1, PER MIN: Performed by: SURGERY

## 2021-04-29 PROCEDURE — 03UM0KZ SUPPLEMENT RIGHT EXTERNAL CAROTID ARTERY WITH NONAUTOLOGOUS TISSUE SUBSTITUTE, OPEN APPROACH: ICD-10-PCS | Performed by: SURGERY

## 2021-04-29 PROCEDURE — 370N000017 HC ANESTHESIA TECHNICAL FEE, PER MIN: Performed by: SURGERY

## 2021-04-29 PROCEDURE — 80061 LIPID PANEL: CPT | Performed by: SURGERY

## 2021-04-29 PROCEDURE — 258N000003 HC RX IP 258 OP 636: Performed by: STUDENT IN AN ORGANIZED HEALTH CARE EDUCATION/TRAINING PROGRAM

## 2021-04-29 PROCEDURE — 272N000282 HC OR IOM SUPPLIES OPNP: Performed by: SURGERY

## 2021-04-29 PROCEDURE — 93005 ELECTROCARDIOGRAM TRACING: CPT

## 2021-04-29 PROCEDURE — 922N000001 HC NEURO MONITORING SERVICE, UP TO 7 HOURS (T1FEE): Performed by: SURGERY

## 2021-04-29 PROCEDURE — 258N000003 HC RX IP 258 OP 636: Performed by: SURGERY

## 2021-04-29 DEVICE — IMP PATCH PERICARDIUM PHOTOFIX BOVINE 0.8X8CM PFP0.8X8: Type: IMPLANTABLE DEVICE | Site: CAROTID | Status: FUNCTIONAL

## 2021-04-29 RX ORDER — HYDROMORPHONE HYDROCHLORIDE 1 MG/ML
.3-.5 INJECTION, SOLUTION INTRAMUSCULAR; INTRAVENOUS; SUBCUTANEOUS EVERY 5 MIN PRN
Status: DISCONTINUED | OUTPATIENT
Start: 2021-04-29 | End: 2021-04-29 | Stop reason: HOSPADM

## 2021-04-29 RX ORDER — GLYCOPYRROLATE 0.2 MG/ML
INJECTION, SOLUTION INTRAMUSCULAR; INTRAVENOUS PRN
Status: DISCONTINUED | OUTPATIENT
Start: 2021-04-29 | End: 2021-04-29

## 2021-04-29 RX ORDER — DIPHENHYDRAMINE HCL 12.5MG/5ML
12.5 LIQUID (ML) ORAL EVERY 6 HOURS PRN
Status: DISCONTINUED | OUTPATIENT
Start: 2021-04-29 | End: 2021-04-30 | Stop reason: HOSPADM

## 2021-04-29 RX ORDER — NALOXONE HYDROCHLORIDE 0.4 MG/ML
0.2 INJECTION, SOLUTION INTRAMUSCULAR; INTRAVENOUS; SUBCUTANEOUS
Status: DISCONTINUED | OUTPATIENT
Start: 2021-04-29 | End: 2021-04-30 | Stop reason: HOSPADM

## 2021-04-29 RX ORDER — MAGNESIUM HYDROXIDE 1200 MG/15ML
LIQUID ORAL PRN
Status: DISCONTINUED | OUTPATIENT
Start: 2021-04-29 | End: 2021-04-29 | Stop reason: HOSPADM

## 2021-04-29 RX ORDER — ACETAMINOPHEN 325 MG/1
650 TABLET ORAL EVERY 4 HOURS PRN
Status: DISCONTINUED | OUTPATIENT
Start: 2021-05-02 | End: 2021-04-30 | Stop reason: HOSPADM

## 2021-04-29 RX ORDER — MAGNESIUM OXIDE 400 MG/1
400 TABLET ORAL EVERY MORNING
Status: DISCONTINUED | OUTPATIENT
Start: 2021-04-30 | End: 2021-04-30 | Stop reason: HOSPADM

## 2021-04-29 RX ORDER — EPHEDRINE SULFATE 50 MG/ML
INJECTION, SOLUTION INTRAMUSCULAR; INTRAVENOUS; SUBCUTANEOUS PRN
Status: DISCONTINUED | OUTPATIENT
Start: 2021-04-29 | End: 2021-04-29

## 2021-04-29 RX ORDER — ACETAMINOPHEN 325 MG/1
975 TABLET ORAL EVERY 8 HOURS
Status: DISCONTINUED | OUTPATIENT
Start: 2021-04-29 | End: 2021-04-30 | Stop reason: HOSPADM

## 2021-04-29 RX ORDER — ONDANSETRON 2 MG/ML
INJECTION INTRAMUSCULAR; INTRAVENOUS PRN
Status: DISCONTINUED | OUTPATIENT
Start: 2021-04-29 | End: 2021-04-29

## 2021-04-29 RX ORDER — SODIUM CHLORIDE 9 MG/ML
INJECTION, SOLUTION INTRAVENOUS CONTINUOUS PRN
Status: DISCONTINUED | OUTPATIENT
Start: 2021-04-29 | End: 2021-04-29

## 2021-04-29 RX ORDER — ONDANSETRON 4 MG/1
4 TABLET, ORALLY DISINTEGRATING ORAL EVERY 6 HOURS PRN
Status: DISCONTINUED | OUTPATIENT
Start: 2021-04-29 | End: 2021-04-30 | Stop reason: HOSPADM

## 2021-04-29 RX ORDER — PROCHLORPERAZINE MALEATE 5 MG
5 TABLET ORAL EVERY 6 HOURS PRN
Status: DISCONTINUED | OUTPATIENT
Start: 2021-04-29 | End: 2021-04-30 | Stop reason: HOSPADM

## 2021-04-29 RX ORDER — FENTANYL CITRATE 50 UG/ML
INJECTION, SOLUTION INTRAMUSCULAR; INTRAVENOUS PRN
Status: DISCONTINUED | OUTPATIENT
Start: 2021-04-29 | End: 2021-04-29

## 2021-04-29 RX ORDER — VIT C/E/ZN/COPPR/LUTEIN/ZEAXAN 60 MG-6 MG
1 CAPSULE ORAL EVERY MORNING
Status: DISCONTINUED | OUTPATIENT
Start: 2021-04-30 | End: 2021-04-30 | Stop reason: HOSPADM

## 2021-04-29 RX ORDER — ATORVASTATIN CALCIUM 10 MG/1
20 TABLET, FILM COATED ORAL EVERY EVENING
Status: DISCONTINUED | OUTPATIENT
Start: 2021-04-29 | End: 2021-04-30 | Stop reason: HOSPADM

## 2021-04-29 RX ORDER — BISACODYL 10 MG
10 SUPPOSITORY, RECTAL RECTAL DAILY PRN
Status: DISCONTINUED | OUTPATIENT
Start: 2021-04-29 | End: 2021-04-30 | Stop reason: HOSPADM

## 2021-04-29 RX ORDER — HEPARIN SODIUM 1000 [USP'U]/ML
INJECTION, SOLUTION INTRAVENOUS; SUBCUTANEOUS PRN
Status: DISCONTINUED | OUTPATIENT
Start: 2021-04-29 | End: 2021-04-29

## 2021-04-29 RX ORDER — DIPHENHYDRAMINE HYDROCHLORIDE 50 MG/ML
12.5 INJECTION INTRAMUSCULAR; INTRAVENOUS EVERY 6 HOURS PRN
Status: DISCONTINUED | OUTPATIENT
Start: 2021-04-29 | End: 2021-04-30 | Stop reason: HOSPADM

## 2021-04-29 RX ORDER — MULTIPLE VITAMINS W/ MINERALS TAB 9MG-400MCG
1 TAB ORAL EVERY MORNING
Status: DISCONTINUED | OUTPATIENT
Start: 2021-04-30 | End: 2021-04-30 | Stop reason: HOSPADM

## 2021-04-29 RX ORDER — SODIUM CHLORIDE, SODIUM LACTATE, POTASSIUM CHLORIDE, CALCIUM CHLORIDE 600; 310; 30; 20 MG/100ML; MG/100ML; MG/100ML; MG/100ML
INJECTION, SOLUTION INTRAVENOUS CONTINUOUS
Status: DISCONTINUED | OUTPATIENT
Start: 2021-04-29 | End: 2021-04-29 | Stop reason: HOSPADM

## 2021-04-29 RX ORDER — NALOXONE HYDROCHLORIDE 0.4 MG/ML
0.4 INJECTION, SOLUTION INTRAMUSCULAR; INTRAVENOUS; SUBCUTANEOUS
Status: DISCONTINUED | OUTPATIENT
Start: 2021-04-29 | End: 2021-04-30 | Stop reason: HOSPADM

## 2021-04-29 RX ORDER — KETOROLAC TROMETHAMINE 30 MG/ML
INJECTION, SOLUTION INTRAMUSCULAR; INTRAVENOUS PRN
Status: DISCONTINUED | OUTPATIENT
Start: 2021-04-29 | End: 2021-04-29

## 2021-04-29 RX ORDER — ASPIRIN 81 MG/1
81 TABLET ORAL DAILY
Status: DISCONTINUED | OUTPATIENT
Start: 2021-04-30 | End: 2021-04-30 | Stop reason: HOSPADM

## 2021-04-29 RX ORDER — NALOXONE HYDROCHLORIDE 0.4 MG/ML
0.2 INJECTION, SOLUTION INTRAMUSCULAR; INTRAVENOUS; SUBCUTANEOUS
Status: DISCONTINUED | OUTPATIENT
Start: 2021-04-29 | End: 2021-04-29

## 2021-04-29 RX ORDER — PROPOFOL 10 MG/ML
INJECTION, EMULSION INTRAVENOUS PRN
Status: DISCONTINUED | OUTPATIENT
Start: 2021-04-29 | End: 2021-04-29

## 2021-04-29 RX ORDER — LIDOCAINE 40 MG/G
CREAM TOPICAL
Status: DISCONTINUED | OUTPATIENT
Start: 2021-04-29 | End: 2021-04-30 | Stop reason: HOSPADM

## 2021-04-29 RX ORDER — ASPIRIN 600 MG/1
600 SUPPOSITORY RECTAL ONCE
Status: COMPLETED | OUTPATIENT
Start: 2021-04-29 | End: 2021-04-29

## 2021-04-29 RX ORDER — HEPARIN SODIUM 5000 [USP'U]/.5ML
5000 INJECTION, SOLUTION INTRAVENOUS; SUBCUTANEOUS EVERY 8 HOURS
Status: DISCONTINUED | OUTPATIENT
Start: 2021-04-30 | End: 2021-04-30 | Stop reason: HOSPADM

## 2021-04-29 RX ORDER — DEXAMETHASONE SODIUM PHOSPHATE 4 MG/ML
INJECTION, SOLUTION INTRA-ARTICULAR; INTRALESIONAL; INTRAMUSCULAR; INTRAVENOUS; SOFT TISSUE PRN
Status: DISCONTINUED | OUTPATIENT
Start: 2021-04-29 | End: 2021-04-29

## 2021-04-29 RX ORDER — ONDANSETRON 2 MG/ML
4 INJECTION INTRAMUSCULAR; INTRAVENOUS EVERY 30 MIN PRN
Status: DISCONTINUED | OUTPATIENT
Start: 2021-04-29 | End: 2021-04-29 | Stop reason: HOSPADM

## 2021-04-29 RX ORDER — CEFAZOLIN SODIUM 2 G/100ML
2 INJECTION, SOLUTION INTRAVENOUS
Status: COMPLETED | OUTPATIENT
Start: 2021-04-29 | End: 2021-04-29

## 2021-04-29 RX ORDER — VITAMIN B COMPLEX
1000 TABLET ORAL EVERY EVENING
Status: DISCONTINUED | OUTPATIENT
Start: 2021-04-29 | End: 2021-04-30 | Stop reason: HOSPADM

## 2021-04-29 RX ORDER — AMLODIPINE BESYLATE 10 MG/1
10 TABLET ORAL EVERY EVENING
Status: DISCONTINUED | OUTPATIENT
Start: 2021-04-29 | End: 2021-04-30 | Stop reason: HOSPADM

## 2021-04-29 RX ORDER — ONDANSETRON 4 MG/1
4 TABLET, ORALLY DISINTEGRATING ORAL EVERY 30 MIN PRN
Status: DISCONTINUED | OUTPATIENT
Start: 2021-04-29 | End: 2021-04-29 | Stop reason: HOSPADM

## 2021-04-29 RX ORDER — ONDANSETRON 2 MG/ML
4 INJECTION INTRAMUSCULAR; INTRAVENOUS EVERY 6 HOURS PRN
Status: DISCONTINUED | OUTPATIENT
Start: 2021-04-29 | End: 2021-04-30 | Stop reason: HOSPADM

## 2021-04-29 RX ORDER — FENTANYL CITRATE 50 UG/ML
25-50 INJECTION, SOLUTION INTRAMUSCULAR; INTRAVENOUS
Status: DISCONTINUED | OUTPATIENT
Start: 2021-04-29 | End: 2021-04-29 | Stop reason: HOSPADM

## 2021-04-29 RX ORDER — NALOXONE HYDROCHLORIDE 0.4 MG/ML
0.4 INJECTION, SOLUTION INTRAMUSCULAR; INTRAVENOUS; SUBCUTANEOUS
Status: DISCONTINUED | OUTPATIENT
Start: 2021-04-29 | End: 2021-04-29

## 2021-04-29 RX ORDER — LIDOCAINE HYDROCHLORIDE 20 MG/ML
INJECTION, SOLUTION INFILTRATION; PERINEURAL PRN
Status: DISCONTINUED | OUTPATIENT
Start: 2021-04-29 | End: 2021-04-29

## 2021-04-29 RX ORDER — POLYETHYLENE GLYCOL 3350 17 G/17G
17 POWDER, FOR SOLUTION ORAL DAILY
Status: DISCONTINUED | OUTPATIENT
Start: 2021-04-30 | End: 2021-04-30 | Stop reason: HOSPADM

## 2021-04-29 RX ORDER — SODIUM CHLORIDE 9 MG/ML
INJECTION, SOLUTION INTRAVENOUS CONTINUOUS
Status: DISCONTINUED | OUTPATIENT
Start: 2021-04-29 | End: 2021-04-30 | Stop reason: HOSPADM

## 2021-04-29 RX ORDER — AMOXICILLIN 250 MG
1 CAPSULE ORAL 2 TIMES DAILY
Status: DISCONTINUED | OUTPATIENT
Start: 2021-04-29 | End: 2021-04-30 | Stop reason: HOSPADM

## 2021-04-29 RX ORDER — OXYCODONE HYDROCHLORIDE 5 MG/1
5 TABLET ORAL EVERY 4 HOURS PRN
Status: DISCONTINUED | OUTPATIENT
Start: 2021-04-29 | End: 2021-04-30 | Stop reason: HOSPADM

## 2021-04-29 RX ORDER — DOCUSATE SODIUM 100 MG/1
100 CAPSULE, LIQUID FILLED ORAL 2 TIMES DAILY
Status: DISCONTINUED | OUTPATIENT
Start: 2021-04-29 | End: 2021-04-30 | Stop reason: HOSPADM

## 2021-04-29 RX ORDER — LABETALOL HYDROCHLORIDE 5 MG/ML
10 INJECTION, SOLUTION INTRAVENOUS EVERY 10 MIN PRN
Status: DISCONTINUED | OUTPATIENT
Start: 2021-04-29 | End: 2021-04-30 | Stop reason: HOSPADM

## 2021-04-29 RX ORDER — NEOSTIGMINE METHYLSULFATE 1 MG/ML
VIAL (ML) INJECTION PRN
Status: DISCONTINUED | OUTPATIENT
Start: 2021-04-29 | End: 2021-04-29

## 2021-04-29 RX ADMIN — ATORVASTATIN CALCIUM 20 MG: 10 TABLET, FILM COATED ORAL at 20:57

## 2021-04-29 RX ADMIN — LIDOCAINE HYDROCHLORIDE 60 MG: 20 INJECTION, SOLUTION INFILTRATION; PERINEURAL at 15:09

## 2021-04-29 RX ADMIN — ONDANSETRON 4 MG: 2 INJECTION INTRAMUSCULAR; INTRAVENOUS at 14:48

## 2021-04-29 RX ADMIN — DEXMEDETOMIDINE HYDROCHLORIDE 0.3 MCG/KG/HR: 100 INJECTION, SOLUTION INTRAVENOUS at 15:20

## 2021-04-29 RX ADMIN — HEPARIN SODIUM 5000 UNITS: 1000 INJECTION INTRAVENOUS; SUBCUTANEOUS at 16:14

## 2021-04-29 RX ADMIN — ROCURONIUM BROMIDE 20 MG: 10 INJECTION INTRAVENOUS at 16:17

## 2021-04-29 RX ADMIN — Medication 1000 UNITS: at 20:58

## 2021-04-29 RX ADMIN — FENTANYL CITRATE 50 MCG: 50 INJECTION, SOLUTION INTRAMUSCULAR; INTRAVENOUS at 15:38

## 2021-04-29 RX ADMIN — PHENYLEPHRINE HYDROCHLORIDE 100 MCG: 10 INJECTION INTRAVENOUS at 17:56

## 2021-04-29 RX ADMIN — ROCURONIUM BROMIDE 10 MG: 10 INJECTION INTRAVENOUS at 17:17

## 2021-04-29 RX ADMIN — GLYCOPYRROLATE 0.2 MG: 0.2 INJECTION, SOLUTION INTRAMUSCULAR; INTRAVENOUS at 18:15

## 2021-04-29 RX ADMIN — NEOSTIGMINE METHYLSULFATE 2.5 MG: 1 INJECTION, SOLUTION INTRAVENOUS at 18:15

## 2021-04-29 RX ADMIN — HEPARIN SODIUM 2000 UNITS: 1000 INJECTION INTRAVENOUS; SUBCUTANEOUS at 17:15

## 2021-04-29 RX ADMIN — PROPOFOL 100 MG: 10 INJECTION, EMULSION INTRAVENOUS at 15:09

## 2021-04-29 RX ADMIN — SODIUM CHLORIDE: 9 INJECTION, SOLUTION INTRAVENOUS at 12:09

## 2021-04-29 RX ADMIN — Medication 10 MG: at 15:24

## 2021-04-29 RX ADMIN — PHENYLEPHRINE HYDROCHLORIDE 0.6 MCG/KG/MIN: 10 INJECTION INTRAVENOUS at 15:25

## 2021-04-29 RX ADMIN — ASPIRIN 600 MG: 600 SUPPOSITORY RECTAL at 19:31

## 2021-04-29 RX ADMIN — FENTANYL CITRATE 50 MCG: 50 INJECTION, SOLUTION INTRAMUSCULAR; INTRAVENOUS at 15:09

## 2021-04-29 RX ADMIN — Medication 5 MG: at 16:41

## 2021-04-29 RX ADMIN — SODIUM CHLORIDE, POTASSIUM CHLORIDE, SODIUM LACTATE AND CALCIUM CHLORIDE: 600; 310; 30; 20 INJECTION, SOLUTION INTRAVENOUS at 17:19

## 2021-04-29 RX ADMIN — PHENYLEPHRINE HYDROCHLORIDE 100 MCG: 10 INJECTION INTRAVENOUS at 17:46

## 2021-04-29 RX ADMIN — GLYCOPYRROLATE 0.2 MG: 0.2 INJECTION, SOLUTION INTRAMUSCULAR; INTRAVENOUS at 15:30

## 2021-04-29 RX ADMIN — ROCURONIUM BROMIDE 50 MG: 10 INJECTION INTRAVENOUS at 15:09

## 2021-04-29 RX ADMIN — CEFAZOLIN SODIUM 2 G: 2 INJECTION, SOLUTION INTRAVENOUS at 14:48

## 2021-04-29 RX ADMIN — DEXAMETHASONE SODIUM PHOSPHATE 4 MG: 4 INJECTION, SOLUTION INTRA-ARTICULAR; INTRALESIONAL; INTRAMUSCULAR; INTRAVENOUS; SOFT TISSUE at 15:15

## 2021-04-29 RX ADMIN — AMLODIPINE BESYLATE 10 MG: 10 TABLET ORAL at 20:58

## 2021-04-29 RX ADMIN — PHENYLEPHRINE HYDROCHLORIDE 100 MCG: 10 INJECTION INTRAVENOUS at 16:42

## 2021-04-29 RX ADMIN — PHENYLEPHRINE HYDROCHLORIDE 100 MCG: 10 INJECTION INTRAVENOUS at 17:10

## 2021-04-29 RX ADMIN — ACETAMINOPHEN 975 MG: 325 TABLET, FILM COATED ORAL at 20:56

## 2021-04-29 RX ADMIN — KETOROLAC TROMETHAMINE 15 MG: 30 INJECTION, SOLUTION INTRAMUSCULAR at 18:05

## 2021-04-29 RX ADMIN — ONDANSETRON 4 MG: 2 INJECTION INTRAMUSCULAR; INTRAVENOUS at 18:05

## 2021-04-29 RX ADMIN — SODIUM CHLORIDE, PRESERVATIVE FREE: 5 INJECTION INTRAVENOUS at 21:04

## 2021-04-29 RX ADMIN — SODIUM CHLORIDE, POTASSIUM CHLORIDE, SODIUM LACTATE AND CALCIUM CHLORIDE: 600; 310; 30; 20 INJECTION, SOLUTION INTRAVENOUS at 13:36

## 2021-04-29 RX ADMIN — DOCUSATE SODIUM 100 MG: 100 CAPSULE, LIQUID FILLED ORAL at 20:58

## 2021-04-29 ASSESSMENT — LIFESTYLE VARIABLES: TOBACCO_USE: 1

## 2021-04-29 ASSESSMENT — ACTIVITIES OF DAILY LIVING (ADL): ADLS_ACUITY_SCORE: 14

## 2021-04-29 ASSESSMENT — MIFFLIN-ST. JEOR: SCORE: 838.7

## 2021-04-29 ASSESSMENT — ENCOUNTER SYMPTOMS: SEIZURES: 0

## 2021-04-29 NOTE — ANESTHESIA PROCEDURE NOTES
Airway       Patient location during procedure: OR  Staff -        CRNA: Alanis Nguyen APRN CRNA       Performed By: CRNA  Consent for Airway        Urgency: elective  Indications and Patient Condition       Indications for airway management: reece-procedural       Induction type:intravenous       Mask difficulty assessment: 2 - vent by mask + OA or adjuvant +/- NMBA    Final Airway Details       Final airway type: endotracheal airway       Successful airway: ETT - single  Endotracheal Airway Details        ETT size (mm): 6.5       Cuffed: yes       Successful intubation technique: video laryngoscopy       VL Blade Size: Dubose 3       Grade View of Cords: 1       Adjucts: stylet       Position: Left       Measured from: gums/teeth       Secured at (cm): 18       Bite block used: None    Post intubation assessment        Placement verified by: capnometry, equal breath sounds and chest rise        Secured with: pink tape       Ease of procedure: easy       Dentition: Intact and Unchanged    Medication(s) Administered   Medication Administration Time: 4/29/2021 3:11 PM

## 2021-04-29 NOTE — CONSULTS
[facility]    Vascular Medicine Consultation     Attestation: I have examined the patient independently of Becky Greenfield PA-C and agree with the examination and plan as delineated below.    Ranjith Weeks MD      Date of Admission:  4/29/2021  Date of Consult (When I saw the patient): 04/29/21    Assessment & Plan   1. Asymptomatic high grade right carotid artery stenosis undergoing a right carotid endarterectomy 4/29/21    Post-operative cares and anti-platelet therapy per Dr. Dangelo / Vascular Surgery. She needs better control of her cholesterol (see below). She did undergo a left carotid endarterectomy already on 12/4/2019.    2. Hyperlipidemia    Her lipid panel this admission is significant for an LDL of 93, HDL 73, triglycerides 72, and total cholesterol 180 while on Simvastatin 10 mg daily. When she was here for her last carotid endarterectomy, her Simvastatin had been changed to Atorvastatin 40 mg daily. However, she stopped this and went back to Simvastatin as she liked the look of the pretty pink Simvastatin pill better as she could differentiate it from her other pills then. Given her severe carotid disease and PAD, she should optimally be on a maximally efficacious statin with a goal LDL of less than 70. As she had no trouble tolerating the Atorvastatin in the past, we will switch her back to that. However, we will place her on only 20 mg daily as she is also on Amlodipine 10 mg daily.     She should have a lipid panel repeated in 3 months through her primary care provider to ascertain whether or not her lipids are at goal on this regimen.     3. PAD    She had originally presented to the Vascular Clinic for PAD back in 2019. She continues to have claudication, left greater than right leg, but can walk about one mile before having to stop and rest. She does not feel this is impacting her life much at this point in time. Her last ABIs were in 10/2019 at which time right  ESTEPHANIA was 0.9 at rest, dropping to 0.68 with exercise and on the left 0.81 at rest, dropping to 0.41 with exercise. This can be monitored as an outpatient by Dr. Dangelo in the Vascular Clinic.     4. Hypertension    Continue prior to admission amlodipine and monitor blood pressure closely.     Reason for Consult   Reason for consult: Asked by Dr. Dangelo to evaluate vascular risk factors and assist with medical management in this 86 year old former smoking female with a history of hypertension, hyperlipidemia, PAD, and bilateral carotid disease presenting for a right carotid endarterectomy for high grade asymptomatic carotid stenosis.     Primary Care Physician   [facility]      History of Present Illness   Nova Ordoñez is a 86 year old female with a remote history of smoking, hypertension, hyperlipidemia, macular degeneration, aortic stenosis, and uterine cancer who originally presented to our vascular clinic in 10/2019 for claudication. During her exam, she was noted to have bilateral carotid bruits. Carotid duplex revealed critical stenosis bilaterally. As there was over 80% stenosis on the left, she first underwent a left carotid endarterectomy 12/4/2019. She recovered from this, but elected to wait longer to do a right carotid endarterectomy. She now feels ready to proceed and presents today for this procedure. She is asymptomatic, although it is somewhat difficult to evaluate for vision changes given her macular degeneration.     She still has left greater than right lower extremity claudication. However, this is not presently limiting her lifestyle. She has no rest pain or non-healing wounds.     Past Medical History   Past Medical History:   Diagnosis Date     Aortic stenosis      Endometrial cancer (H)      Ute (hard of hearing)     Bilat; wears hearing aides.     Hyperlipidemia      Hypertension      Left carotid artery stenosis      Macular degeneration, wet (H)      Osteoporosis      PONV  (postoperative nausea and vomiting)      Uterine cancer (H)        Past Surgical History   Past Surgical History:   Procedure Laterality Date     APPENDECTOMY  1945     carotid artery stenosis       COLONOSCOPY  2012    x2 small polyps; no need for intervention     ENDARTERECTOMY CAROTID Left 12/4/2019    Procedure: LEFT CAROTID ENDARTERECTOMY WITH EEG;  Surgeon: Oscar aMrquez MD;  Location: [facility] OR     ENT SURGERY      T&A     GYN SURGERY      hysterectomy     HYSTERECTOMY  1965     THYROIDECTOMY  Left 1994    Partial Thyroidectomy; Patient is not positive on side that was removed       Prior to Admission Medications   Prior to Admission Medications   Prescriptions Last Dose Informant Patient Reported? Taking?   Vitamin D, Cholecalciferol, 25 MCG (1000 UT) TABS 4/28/2021 at PM Self Yes Yes   Sig: Take 1,000 Units by mouth every evening    amLODIPine (NORVASC) 10 MG tablet 4/28/2021 at PM Self Yes Yes   Sig: Take 10 mg by mouth every evening    aspirin (ASA) 81 MG EC tablet 4/29/2021 at AM Self No Yes   Sig: Take 1 tablet (81 mg) by mouth daily   Patient taking differently: Take 81 mg by mouth every morning    bevacizumab (AVASTIN) 25 MG/ML injection  Self Yes Yes   Sig: Inject 1.25 mg into the vein See Admin Instructions GETS EVERY 5 WEEKS. Next appointment Monday, May 24, 2021   hypromellose (ARTIFICIAL TEARS) 0.5 % SOLN ophthalmic solution  at PRN Self Yes Yes   Sig: Place 1 drop into both eyes 4 times daily as needed for dry eyes   magnesium oxide 400 MG CAPS 4/27/2021 at AM Self Yes Yes   Sig: Take 1 capsule by mouth every morning    multivitamin (OCUVITE) TABS tablet 4/27/2021 at AM Self Yes Yes   Sig: Take 1 tablet by mouth every morning    multivitamin w/minerals (MULTI-VITAMIN) tablet 4/28/2021 at AM Self Yes Yes   Sig: Take 1 tablet by mouth every morning    naproxen sodium (ANAPROX) 220 MG tablet more than 2 weeks Self Yes Yes   Sig: Take 220 mg by mouth 2 times daily as needed for moderate pain     simvastatin (ZOCOR) 10 MG tablet 4/28/2021 at PM Self Yes Yes   Sig: Take 10 mg by mouth every evening       Facility-Administered Medications: None     Allergies   No Known Allergies    Social History   Nova Ordoñez  reports that she has quit smoking. Her smoking use included cigarettes. She smoked 0.00 packs per day. She has never used smokeless tobacco. She reports current alcohol use. She reports that she does not use drugs.    Family History   History reviewed. No pertinent family history.    Review of Systems   The 10 point Review of Systems is negative other than noted in the HPI or here.     Physical Exam   Temp: 98  F (36.7  C) Temp src: Temporal BP: (!) 146/78 Pulse: 80   Resp: 10 SpO2: 98 % O2 Device: None (Room air)    Vital Signs with Ranges  Temp:  [98  F (36.7  C)] 98  F (36.7  C)  Pulse:  [80] 80  Resp:  [10] 10  BP: (146)/(78) 146/78  SpO2:  [98 %] 98 %  111 lbs 1.6 oz    Constitutional: awake, alert, cooperative, no apparent distress, and appears stated age  Eyes: Lids and lashes normal, pupils equal, round and reactive to light, extra ocular muscles intact, sclera clear, conjunctiva normal  ENT: normocepalic, without obvious abnormality, oropharynx pink and moist  Hematologic / Lymphatic: no lymphadenopathy  Respiratory: No increased work of breathing, good air exchange, clear to auscultation bilaterally, no crackles or wheezing  Cardiovascular: regular rate and rhythm, normal S1 and S2 and no murmur noted  GI: Normal bowel sounds, soft, non-distended, non-tender  Skin: no redness, warmth, or swelling, no rashes and no lesions  Musculoskeletal: There is no redness, warmth, or swelling of the joints.  Full range of motion noted.  Motor strength is 5 out of 5 all extremities bilaterally.  Tone is normal.  Neurologic: Awake, alert, oriented to name, place and time.  Cranial nerves II-XII are grossly intact.  Motor is 5 out of 5 bilaterally.    Neuropsychiatric:  Normal affect, memory,  insight.  Pulses: Palpable right pedal pulses. Unable to palpate left pedal pulses. Right carotid bruit appreciated.     Data   Most Recent 3 CBC's:  Recent Labs   Lab Test 10/01/19  1358   WBC 6.4   HGB 14.6   MCV 89        Most Recent 3 BMP's:  Recent Labs   Lab Test 04/29/21  1040 12/05/19  0739 12/04/19  0721    140 139   POTASSIUM 3.4 3.5 3.2*   CHLORIDE 109 109 108   CO2 27 24 28   BUN 19 19 17   CR 0.84 0.78 0.88   ANIONGAP 6 7 3   TERRENCE 8.7 8.5 9.0   GLC 92 102* 91       Most Recent Cholesterol Panel:  Recent Labs   Lab Test 04/29/21  1040   CHOL 180   LDL 93   HDL 73   TRIG 72     Most Recent Hemoglobin A1c:  Recent Labs   Lab Test 12/04/19  0721   A1C 5.4

## 2021-04-29 NOTE — ANESTHESIA PROCEDURE NOTES
Arterial Line Procedure Note  Pre-Procedure   Staff -        Anesthesiologist:  John Ang MD       Performed By: anesthesiologist       Location: pre-op       Pre-Anesthestic Checklist: patient identified, IV checked, site marked, risks and benefits discussed, informed consent, monitors and equipment checked, pre-op evaluation and at physician/surgeon's request  Timeout:       Correct Patient: Yes        Correct Procedure: Yes        Correct Site: Yes        Correct Position: Yes   Procedure   Procedure: arterial line       Laterality: right       Insertion Site: radial.  Sterile Prep        Standard elements of sterile barrier followed       Skin prep: Chloraprep  Insertion/Injection        Technique: Seldinger Technique and Darron's test completed        Catheter Type/Size: 20 gauge, 1.75 in/4.5 cm quick cath (integral wire)  Narrative        Tegaderm dressing used.       Complications: None apparent,        Arterial waveform: Yes        IBP within 10% of NIBP: Yes  Comments:  Pt tolerated procedure well

## 2021-04-29 NOTE — ANESTHESIA CARE TRANSFER NOTE
Patient: Nova Ordoñez    Procedure(s):  RIGHT CAROTID ENDARTERECTOMY WITH BOVINE PATCH ANGIOPLASTY AND ELECTROENCEPHALOGRAM MONITORING    Diagnosis: Carotid stenosis, right [I65.21]  Diagnosis Additional Information: No value filed.    Anesthesia Type:   General     Note:    Oropharynx: oropharynx clear of all foreign objects and spontaneously breathing  Level of Consciousness: awake  Oxygen Supplementation: face mask  Level of Supplemental Oxygen (L/min / FiO2): 4  Independent Airway: airway patency satisfactory and stable  Dentition: dentition unchanged  Vital Signs Stable: post-procedure vital signs reviewed and stable  Report to RN Given: handoff report given  Patient transferred to: PACU  Comments: Neuromuscular blockade reversed after TOF 4/4, spontaneous respirations, adequate tidal volumes, followed commands to voice, oropharynx suctioned with soft flexible catheter, extubated atraumatically, extubated with suction, airway patent after extubation.  Oxygen via facemask at 4 liters per minute to PACU. Oxygen tubing connected to wall O2 in PACU, SpO2, NiBP, and EKG monitors and alarms on and functioning, Adrienne Hugger warmer connected to patient gown, report on patient's clinical status given to PACU RN, RN questions answered.     Handoff Report: Identifed the Patient, Identified the Reponsible Provider, Reviewed the pertinent medical history, Discussed the surgical course, Reviewed Intra-OP anesthesia mangement and issues during anesthesia, Set expectations for post-procedure period and Allowed opportunity for questions and acknowledgement of understanding      Vitals: (Last set prior to Anesthesia Care Transfer)  CRNA VITALS  4/29/2021 1759 - 4/29/2021 1838      4/29/2021             ART BP:  (!) 126/2    ART Mean:  66    Resp Rate (set):  10        Electronically Signed By: ELZA Zavala CRNA  April 29, 2021  6:38 PM

## 2021-04-29 NOTE — ANESTHESIA PREPROCEDURE EVALUATION
Anesthesia Pre-Procedure Evaluation    Patient: Nova Ordoñez   MRN: [id number] : 1935        Preoperative Diagnosis: Carotid stenosis, right [I65.21]   Procedure : Procedure(s):  RIGHT CAROTID ENDARTERECTOMY WITH ELECTROENCEPHALOGRAM     Past Medical History:   Diagnosis Date     Aortic stenosis      Endometrial cancer (H)      [place] (hard of hearing)     [place]; wears hearing aides.     Hyperlipidemia      Hypertension      Left carotid artery stenosis      Macular degeneration, wet (H)      Osteoporosis      PONV (postoperative nausea and vomiting)      Uterine cancer (H)       Past Surgical History:   Procedure Laterality Date     APPENDECTOMY       carotid artery stenosis       COLONOSCOPY  2012    x2 small polyps; no need for intervention     ENDARTERECTOMY CAROTID Left 2019    Procedure: LEFT CAROTID ENDARTERECTOMY WITH EEG;  Surgeon: Oscar Marquez MD;  Location: [facility] OR     ENT SURGERY      T&A     GYN SURGERY      hysterectomy     HYSTERECTOMY  1965     THYROIDECTOMY  Left     Partial Thyroidectomy; Patient is not positive on side that was removed      No Known Allergies   Social History     Tobacco Use     Smoking status: Former Smoker     Packs/day: 0.00     Types: Cigarettes     Smokeless tobacco: Never Used   Substance Use Topics     Alcohol use: Yes     Frequency: 2-3 times a week     Drinks per session: 1 or 2     Binge frequency: Never     Comment: social      Wt Readings from Last 1 Encounters:   21 50.4 kg (111 lb 1.6 oz)        Anesthesia Evaluation   Pt has had prior anesthetic.     History of anesthetic complications  - PONV.      ROS/MED HX  ENT/Pulmonary:     (+) tobacco use, Past use,  (-) sleep apnea   Neurologic:    (-) no seizures and no CVA   Cardiovascular:     (+) Dyslipidemia hypertension-Peripheral Vascular Disease-- Carotid Stenosis. ---    METS/Exercise Tolerance:     Hematologic:       Musculoskeletal:       GI/Hepatic:    (-) GERD and liver disease    Renal/Genitourinary:    (-) renal disease   Endo:    (-) Type II DM and thyroid disease   Psychiatric/Substance Use:       Infectious Disease:       Malignancy:       Other:            Physical Exam    Airway        Mallampati: II   TM distance: > 3 FB   Neck ROM: full   Mouth opening: > 3 cm    Respiratory Devices and Support         Dental  no notable dental history         Cardiovascular   cardiovascular exam normal          Pulmonary   pulmonary exam normal                OUTSIDE LABS:  CBC:   Lab Results   Component Value Date    WBC 6.4 10/01/2019    HGB 14.6 10/01/2019    HCT 43.1 10/01/2019     10/01/2019     BMP:   Lab Results   Component Value Date     04/29/2021     12/05/2019    POTASSIUM 3.4 04/29/2021    POTASSIUM 3.5 12/05/2019    CHLORIDE 109 04/29/2021    CHLORIDE 109 12/05/2019    CO2 27 04/29/2021    CO2 24 12/05/2019    BUN 19 04/29/2021    BUN 19 12/05/2019    CR 0.84 04/29/2021    CR 0.78 12/05/2019    GLC 92 04/29/2021     (H) 12/05/2019     COAGS: No results found for: PTT, INR, FIBR  POC: No results found for: BGM, HCG, HCGS  HEPATIC:   Lab Results   Component Value Date    ALBUMIN 4.1 10/01/2019    PROTTOTAL 7.5 10/01/2019    ALT 29 10/01/2019    AST 22 10/01/2019    ALKPHOS 68 10/01/2019    BILITOTAL 0.3 10/01/2019     OTHER:   Lab Results   Component Value Date    A1C 5.4 12/04/2019    TERRENCE 8.7 04/29/2021       Anesthesia Plan    ASA Status:  3      Anesthesia Type: General.     - Airway: ETT   Induction: Intravenous.   Maintenance: Balanced.   Techniques and Equipment:     - Lines/Monitors: 2nd IV, Arterial Line     Consents    Anesthesia Plan(s) and associated risks, benefits, and realistic alternatives discussed. Questions answered and patient/representative(s) expressed understanding.     - Discussed with:  Patient         Postoperative Care    Pain management: Multi-modal analgesia.   PONV prophylaxis: Ondansetron (or other 5HT-3), Dexamethasone or  Solumedrol, Background Propofol Infusion     Comments:                John Ang MD

## 2021-04-29 NOTE — BRIEF OP NOTE
[facility]    Brief Operative Note    Pre-operative diagnosis: Carotid stenosis, right [I65.21]  Post-operative diagnosis Same as pre-operative diagnosis    Procedure: Procedure(s):  RIGHT CAROTID ENDARTERECTOMY WITH BOVINE PATCH ANGIOPLASTY AND ELECTROENCEPHALOGRAM MONITORING  Surgeon: Surgeon(s) and Role:     * Mick Dangelo MD - Primary     * Peña Escoto PA-C - Assisting     * Alisa Shipman MD - Fellow - Assisting  Anesthesia: General   Estimated blood loss: Less than 100 ml  Drains: None  Specimens: * No specimens in log *  Findings:   Focal plaque in the proximal ICA causing significant stenosis. Neuro intact on extubation.  Complications: None.  Implants:   Implant Name Type Inv. Item Serial No.  Lot No. LRB No. Used Action   IMP PATCH PERICARDIUM PHOTOFIX BOVINE 0.8X8CM [id number] Bone/Tissue/Biologic IMP PATCH PERICARDIUM PHOTOFIX BOVINE 0.8X8CM [id number]  [facility] [id number] Right 1 Implanted

## 2021-04-30 VITALS
OXYGEN SATURATION: 96 % | SYSTOLIC BLOOD PRESSURE: 100 MMHG | RESPIRATION RATE: 16 BRPM | DIASTOLIC BLOOD PRESSURE: 61 MMHG | TEMPERATURE: 97.5 F | WEIGHT: 130.73 LBS | HEART RATE: 72 BPM | BODY MASS INDEX: 27.44 KG/M2 | HEIGHT: 58 IN

## 2021-04-30 LAB — GLUCOSE BLDC GLUCOMTR-MCNC: 94 MG/DL (ref 70–99)

## 2021-04-30 PROCEDURE — 999N001017 HC STATISTIC GLUCOSE BY METER IP

## 2021-04-30 PROCEDURE — 250N000013 HC RX MED GY IP 250 OP 250 PS 637: Performed by: PHYSICIAN ASSISTANT

## 2021-04-30 PROCEDURE — 99233 SBSQ HOSP IP/OBS HIGH 50: CPT | Performed by: PHYSICIAN ASSISTANT

## 2021-04-30 PROCEDURE — 250N000013 HC RX MED GY IP 250 OP 250 PS 637: Performed by: STUDENT IN AN ORGANIZED HEALTH CARE EDUCATION/TRAINING PROGRAM

## 2021-04-30 RX ORDER — ATORVASTATIN CALCIUM 20 MG/1
20 TABLET, FILM COATED ORAL EVERY EVENING
Qty: 30 TABLET | Refills: 3 | Status: SHIPPED | OUTPATIENT
Start: 2021-04-30 | End: 2021-05-30

## 2021-04-30 RX ORDER — CLOPIDOGREL BISULFATE 75 MG/1
75 TABLET ORAL DAILY
Qty: 30 TABLET | Refills: 0 | Status: SHIPPED | OUTPATIENT
Start: 2021-04-30 | End: 2021-04-30

## 2021-04-30 RX ORDER — CLOPIDOGREL BISULFATE 75 MG/1
75 TABLET ORAL DAILY
Status: DISCONTINUED | OUTPATIENT
Start: 2021-04-30 | End: 2021-04-30 | Stop reason: HOSPADM

## 2021-04-30 RX ORDER — ACETAMINOPHEN 325 MG/1
975 TABLET ORAL EVERY 8 HOURS
COMMUNITY
Start: 2021-04-30

## 2021-04-30 RX ADMIN — DOCUSATE SODIUM 100 MG: 100 CAPSULE, LIQUID FILLED ORAL at 09:02

## 2021-04-30 RX ADMIN — CLOPIDOGREL BISULFATE 75 MG: 75 TABLET ORAL at 09:02

## 2021-04-30 RX ADMIN — MULTIPLE VITAMINS W/ MINERALS TAB 1 TABLET: TAB at 09:03

## 2021-04-30 RX ADMIN — Medication 1 CAPSULE: at 09:02

## 2021-04-30 RX ADMIN — ASPIRIN 81 MG: 81 TABLET, DELAYED RELEASE ORAL at 09:03

## 2021-04-30 RX ADMIN — Medication 400 MG: at 09:03

## 2021-04-30 ASSESSMENT — ACTIVITIES OF DAILY LIVING (ADL)
ADLS_ACUITY_SCORE: 14

## 2021-04-30 ASSESSMENT — MIFFLIN-ST. JEOR: SCORE: 927.75

## 2021-04-30 NOTE — ANESTHESIA POSTPROCEDURE EVALUATION
Patient: Nova Ordoñez    Procedure(s):  RIGHT CAROTID ENDARTERECTOMY WITH BOVINE PATCH ANGIOPLASTY AND ELECTROENCEPHALOGRAM MONITORING    Diagnosis:Carotid stenosis, right [I65.21]  Diagnosis Additional Information: No value filed.    Anesthesia Type:  General    Note:  Disposition: Outpatient   Postop Pain Control: Uneventful            Sign Out: Well controlled pain   PONV:    Neuro/Psych: Uneventful            Sign Out: Acceptable/Baseline neuro status   Airway/Respiratory: Uneventful            Sign Out: Acceptable/Baseline resp. status   CV/Hemodynamics: Uneventful            Sign Out: Acceptable CV status   Other NRE: NONE   DID A NON-ROUTINE EVENT OCCUR? No           Last vitals:  Vitals:    04/29/21 1832 04/29/21 1840 04/29/21 1850   BP: 122/63 121/68    Pulse: 63 75 67   Resp: 11 14 18   Temp: 36.8  C (98.2  F)     SpO2: 99% 92% 92%       Last vitals prior to Anesthesia Care Transfer:  CRNA VITALS  4/29/2021 1759 - 4/29/2021 1859      4/29/2021             ART BP:  (!) 126/2    ART Mean:  66    Resp Rate (set):  10          Electronically Signed By: Tim Blackwell MD  April 29, 2021  7:03 PM

## 2021-04-30 NOTE — CONSULTS
HOSPITALIST CONSULT CHART CHECK:    Hospitalist service was consulted for after hours cross coverage only. We will peripherally follow and chart check.     - For medical concerns during business hours, call the [facility] at [phone] to have the rounding/on call Vascular Medicine (NOT VASCULAR SURGERY) MD paged.    - After business hours, for medical concerns on this patient, please page Hospitalist staff.    - For vascular surgical questions, please page the appropriate surgeon (primary vascular surgeon or on call vascular surgeon) based upon the time of day.       Jovani Simpson PA-C

## 2021-04-30 NOTE — DISCHARGE SUMMARY
Vascular Surgery Service Discharge Summary:     NAME: Nova Ordoñez   MRN: [id number]   : 1935   PCP: [facility]    DATE OF ADMISSION: 2021       Procedure/Surgery Information   Procedure: Procedure(s):  RIGHT CAROTID ENDARTERECTOMY WITH BOVINE PATCH ANGIOPLASTY AND ELECTROENCEPHALOGRAM MONITORING   Surgeon(s): Surgeon(s) and Role:     * Mick Dangelo MD - Primary     * Peña Escoto PA-C - Assisting     * Alisa Shipman MD - Fellow - Assisting   Specimens: * No specimens in log *         PRE/POSTOPERATIVE DIAGNOSES:    80% asymptomatic right carotid stenosis.    PROCEDURES PERFORMED, 2021:    Right carotid endarterectomy with bovine pericardial patch angioplasty.    INTRAOPERATIVE FINDINGS: None noted    POSTOPERATIVE COMPLICATIONS: None    DATE OF DISCHARGE: 2021    ADMISSION HPI (from 2021):  86-year-old female who is 1-1/2 years status post uncomplicated left carotid endarterectomy.  She has an 80% asymptomatic right carotid stenosis.  After a discussion as to the natural history of asymptomatic carotid disease, she has elected to proceed with a right carotid endarterectomy.  Of note, there has been some progression of disease on her right carotid ultrasound over the past 2 years.    HOSPITAL COURSE: Nova Ordoñez is a 86 year old female who was admitted on 2021 and underwent the above-named procedures.  She tolerated the procedure well and postoperatively was transferred to the general post-surgical unit.  The remainder of her course was essentiallly uncomplicated.  Prior to discharge, her pain was controlled well with Tylenol.  she was able to perform ADLs and ambulate independently without difficulty, and had full return of bowel and bladder function.  On 2021, she was discharged to home in stable condition.      Time Spent on this Encounter   Alisa RICHTER MD, personally saw the patient today and spent less than or equal to 30 minutes  discharging this patient.    Vascular Surgery Core Measures:  Continue Aspirin, statin    Physical Exam:  Constitutional: healthy, alert, no acute distress and cooperative   Cardiovascular: RRR without  murmurs, rubs, or gallops noted  Respiratory: breathing unlabored without secondary muscle use  Psychiatric: mentation appears normal and affect normal/bright  Neck: no asymmetry, R CEA incision CDI w/ ecchymosis but no hematoma  GI/Abdomen: abdomen soft, non-tender. No palpable masses  MSK: able to move all extremities without weakness or ataxia  Extremities: no open lesions, extremities warm and well perfused   Hematology: no bruising on visible skin  Neuro: tongue midline, no facial drooping, 5/5 strength bilaterally upper and lower extremities, no sensory loss      PAST MEDICAL HISTORY:  Past Medical History:   Diagnosis Date     Aortic stenosis      Endometrial cancer (H)      [place] (hard of hearing)     [place]; wears hearing aides.     Hyperlipidemia      Hypertension      Left carotid artery stenosis      Macular degeneration, wet (H)      Osteoporosis      PONV (postoperative nausea and vomiting)      Uterine cancer (H)        PAST SURGICAL HISTORY:  Past Surgical History:   Procedure Laterality Date     APPENDECTOMY  1945     carotid artery stenosis       COLONOSCOPY  2012    x2 small polyps; no need for intervention     ENDARTERECTOMY CAROTID Left 12/4/2019    Procedure: LEFT CAROTID ENDARTERECTOMY WITH EEG;  Surgeon: Oscar Marquez MD;  Location:  OR     ENT SURGERY      T&A     GYN SURGERY      hysterectomy     HYSTERECTOMY  1965     THYROIDECTOMY  Left 1994    Partial Thyroidectomy; Patient is not positive on side that was removed       Labs:  Recent Labs   Lab Test 04/29/21  1040 10/01/19  1358   WBC  --  6.4   RBC  --  4.87   HGB  --  14.6   HCT  --  43.1   MCV  --  89   MCH  --  30.0   MCHC  --  33.9    186     Recent Labs   Lab Test 04/29/21  1040 12/05/19  0739 12/04/19  0721    POTASSIUM 3.4 3.5 3.2*   CHLORIDE 109 109 108   BUN 19 19 17       DISCHARGE INSTRUCTIONS:  Discharge Orders      Reason for your hospital stay    Right carotid endarterectomy     Follow-up and recommended labs and tests     Please call [phone] to setup a followup appointment with Dr. Dangelo (Vascular Surgery) in 2 weeks, unless previously scheduled.    [facility] [place] [address]  T: [phone]     Activity    Gradually resume your normal activity level prior to your hospital visit. No driving for 1-2 weeks until your neck stiffness/discomfort has subsided and you have full neck range of motion     Wound care and dressings    Steri strips applied to wound to protect healing incision. Do not pull off, they will fall off on their own. Okay to trim.  You can get those incisions wet, but avoid soaking/submerging them for the next 3 weeks to allow proper healing. If at the incision site you start having new/different discharge/drainage, foul smell, or redness and warmth to the touch, you should call the vascular surgery office. Also call the office if you have a fever of 100.5F or greater.     Diet    Follow this diet upon discharge: Advance to a regular diet as tolerated     Discharge Medications   Current Discharge Medication List      START taking these medications    Details   acetaminophen (TYLENOL) 325 MG tablet Take 3 tablets (975 mg) by mouth every 8 hours  Qty:      Associated Diagnoses: Carotid stenosis, right      atorvastatin (LIPITOR) 20 MG tablet Take 1 tablet (20 mg) by mouth every evening  Qty: 30 tablet, Refills: 3    Associated Diagnoses: Carotid stenosis, right      clopidogrel (PLAVIX) 75 MG tablet Take 1 tablet (75 mg) by mouth daily  Qty: 30 tablet, Refills: 0    Associated Diagnoses: Carotid stenosis, right         CONTINUE these medications which have NOT CHANGED    Details   amLODIPine (NORVASC) 10 MG tablet Take 10 mg by mouth  every evening       aspirin (ASA) 81 MG EC tablet Take 1 tablet (81 mg) by mouth daily  Qty: 30 tablet, Refills: 3    Associated Diagnoses: Carotid stenosis, asymptomatic, right      bevacizumab (AVASTIN) 25 MG/ML injection Inject 1.25 mg into the vein See Admin Instructions GETS EVERY 5 WEEKS. Next appointment Monday, May 24, 2021      hypromellose (ARTIFICIAL TEARS) 0.5 % SOLN ophthalmic solution Place 1 drop into both eyes 4 times daily as needed for dry eyes      magnesium oxide 400 MG CAPS Take 1 capsule by mouth every morning       !! multivitamin (OCUVITE) TABS tablet Take 1 tablet by mouth every morning       !! multivitamin w/minerals (MULTI-VITAMIN) tablet Take 1 tablet by mouth every morning       naproxen sodium (ANAPROX) 220 MG tablet Take 220 mg by mouth 2 times daily as needed for moderate pain       Vitamin D, Cholecalciferol, 25 MCG (1000 UT) TABS Take 1,000 Units by mouth every evening        !! - Potential duplicate medications found. Please discuss with provider.      STOP taking these medications       simvastatin (ZOCOR) 10 MG tablet Comments:   Reason for Stopping:             Allergies   No Known Allergies       Alisa Shipman MD  Vascular Surgery Fellow  Pager [phone]

## 2021-04-30 NOTE — PHARMACY
Prescriber Notification Note    The pharmacist has communicated with this patient's provider regarding a concern or therapy recommendation.    Notified Person: Alisa Shipman  Date/Time of Notification: 4/30 0820  Interaction: phone  Concern/Recommendation: Plavix was not ordered for discharge     Comments/Additional Details:  No need for Plavix on discharge, note will be amended.      Thank you, Hermelinda VIRAMONTES, PharmD

## 2021-04-30 NOTE — PLAN OF CARE
VSS. A/O. SBA. R neck incision CDI. Slight r face numbness from the surgery, improving. Neuro intact. Denies pain. Tolerating diet. Voiding fine after garcia removal. D'c meds/ papers given to pt. Son giving her ride home. No question or concerns.

## 2021-04-30 NOTE — OP NOTE
Procedure Date: 04/29/2021    PREOPERATIVE DIAGNOSIS:   80% asymptomatic right carotid stenosis.    POSTOPERATIVE DIAGNOSIS:   80% asymptomatic right carotid stenosis.    PROCEDURE PERFORMED:  Right carotid endarterectomy with bovine pericardial patch angioplasty.    SURGEON:  Brad Dangelo MD    :  Alisa Shipman MD    FIRST ASSISTANT:  Mr. Peña Escoto PA-C.   Jevon's assistance in this case was critical for his expertise in exposure and suctioning.  He assisted with both the endarterectomy as well as the bovine pericardial patch angioplasty and significantly expedited the procedure.    OPERATIVE INDICATIONS:  This patient is an 86-year-old female who is 1-1/2 years status post uncomplicated left carotid endarterectomy.  She has an 80% asymptomatic right carotid stenosis.  After a discussion as to the natural history of asymptomatic carotid disease, she has elected to proceed with a right carotid endarterectomy.  Of note, there has been some progression of disease on her right carotid ultrasound over the past 2 years.    OPERATIVE FINDINGS:  There was a focal 80% stenosis involving the proximal right internal carotid artery 1.5 cm above the bifurcation, the distal right internal carotid artery was soft and relatively disease-free.  At the completion, the procedure, this patient was neurologically intact.    DESCRIPTION OF TECHNIQUE:  After informed consent was obtained, the patient was brought to the operating room and placed on the table in a supine position.  General endotracheal anesthesia was achieved without incident.  A Weeks catheter and EEG leads were placed.  Her right neck was prepped and draped in the usual sterile fashion.  Timeout was called, and we verified the patient's identity, the operative site, and the proposed procedure.  We began with an oblique right-sided sternocleidomastoid incision.  There was some scarring due to a partial thyroidectomy in the remote past.  The mid  cervical common carotid was dissected free at the level of the omohyoid muscle, and it was encircled with a vessel loop.  The vagus nerve was identified posteriorly and carefully avoided throughout the remainder of this dissection.  Dissection continued cephalad to the carotid bifurcation.  A crossing facial vein was divided between silk ties.  The external carotid artery was dissected free at its origin and encircled with a vessel loop.  We continued tracing the ansa cephalad as we dissected out the internal carotid artery.  This dissection continued for roughly 2.5 cm, and we traced the ansa to its confluence with the hypoglossal nerve.  The ansa was kept intact, and we carefully avoided the hypoglossal nerve throughout the remainder of this dissection.  The patient was systemically heparinized with 5000 units of intravenous heparin.  After a 5-minute circulation time, the distal internal carotid was occluded with a Chuck clamp.  The external carotid was controlled with a vessel loop, and the mid cervical common carotid was controlled with an angled DeBakey vascular clamp.  There were no EEG changes.  An arteriotomy was made on the mid cervical common carotid and extended up the internal carotid to a point above the level of disease.  The intima at that level was scored with a Anvik blade.  The endarterectomy plane was developed using a black spatula.  We also scored the intima proximally with a Anvik blade.  We obtained an excellent proximal and distal taper point.  We performed eversion endarterectomy on the external carotid.  The plaque was removed from the field en bloc.  A small side branch off of the proximal external carotid was avulsed during performance of the eversion endarterectomy.  This was repaired with a 7-0 Prolene suture by ligating that avulsed side branch.  Residual strands of the fibrinous debris were removed until we were fully satisfied with the quality of the endarterectomized surface.   Both our distal and proximal taper points were tacked down with interrupted 7-0 Prolene sutures.  A bovine pericardial patch was selected.  We proceeded with patch angioplasty using circumferential running 6-0 Prolene suture.  Prior to placing the final stitches in the patch angioplasty suture line, all clamps and vessel loops were briefly released to flush any debris out of the arterial lumen.  The lumen was copiously irrigated with heparinized saline and observed to be clear.  The remaining stitches in the patch angioplasty suture line were placed, and it was secured as flow was directed preferentially up the external carotid for several heartbeats.  The patch did require two 6-0 Prolene repair sutures to obtain satisfactory hemostasis.  Following this, flow was restored back up the internal carotid as well.  The patient had been rebolused with an additional 2000 units of heparin during this procedure.  Surgicel gauze and gentle compression were held over the artery for 10 minutes.  Following this, we had excellent hemostasis.  The wound was again copiously irrigated.  Deep cervical fascia was reapproximated with interrupted 2-0 Vicryl sutures.  Platysma was closed with a running 3-0 Vicryl suture.  Skin was closed with a 4-0 Monocryl running subcuticular stitch.  Steri-Strips and a sterile dressing were applied.  Final sponge and needle count were reported as correct.  The patient tolerated the procedure well without incident.  She was extubated and transferred to her bed.  She was observed to follow commands with all 4 extremities.  Her smile appeared symmetric, although it was a bit difficult to tell, as she is still edentulous.  Her tongue had minimal right-sided deviation.  She was returned extubated and hemodynamically stable to the recovery room.    Mick Dangelo MD        D: 04/29/2021   T: 04/30/2021   MT: [facility]    Name:     SHYAM LIANG  N:      [id number]        Account:        [id number]    :      1935           Procedure Date: 2021     Document: [id number]

## 2021-04-30 NOTE — DISCHARGE INSTRUCTIONS
Carotid Endartectomy  Post-Operative Instructions    Typical length of stay in the hospital is 1-2 days.  On occasion, an evening in the Intensive Care Unit may be required for blood pressure regulation.    Activity    Most people are able to resume normal activities 1-2 weeks after surgery.  The key is to gradually increase your level of activity as you are able.  It is not uncommon to feel easily fatigued - this will improve with time.      You should avoid heavy lifting more than 30 lbs for 1 week.      You may return to work when you feel able - typically 1-2 weeks after surgery, depending on the type of work you do.      You should not drive a car for 1 week after surgery.  In addition,  you can not be taking prescription strength pain medication and you must feel strong enough to drive safely.    Incision Care    You can shower or bathe 2 days after surgery - avoid rubbing and soaking your incision.      You may have strips of white tape called  steri-strips  across your incision; they should stay on for 5-7 days or until the ends start to curl up.  You can then remove the steri-strips, or we will remove them at your post-operative appointment.      Avoid shaving directly over the incision for 2-3 weeks.    Common Concerns    You may notice mild skin numbness on the side of your surgery in your neck, chin, face, and earlobe.  This is due to nerve  irritation  and will gradually resolve over the next several months.  Call our office if you experience any short-lasting episode of numbness or weakness affecting one side of your body.      Some bruising and firmness around you incision can be expected.  This will soften and resolve over the next few weeks.  You may notice that some discoloration spreads to an area lower than the incision, such as the shoulder, neck or upper chest.  Likewise, swelling can occur near the incision or below the chin.  Call our office if the swelling or bruising worsens, or if you have  difficulty swallowing.    Diet    You may resume a regular diet before you leave the hospital.  You may find that it is best to try smaller, more frequent meals until your appetite returns.    Medications    You may be started on a blood thinner after surgery - typically Aspirin and / or Plavix.      You may be given a prescription for pain medication after surgery.  If you need to have this refilled, please call your pharmacy where you had it filled.  They will then call us for approval.  Please do not call after hours for a refill on pain medication.    Post-Operative Appointments    You need to see your surgeon in the clinic approximately 1-2 weeks after surgery.  Post-operative appointment:   Date: _____________________________  Time: ____________________________  Dr. ______________________________      You will have an ultrasound test and evaluation with your surgeon 90 days (3 months) after surgery.      We will notify you by mail when you are due to schedule further ultrasound testing and evaluation; typically this is done annually.     When You Should Call Our Office    Body aches, chills or temperature greater than 101      Incision redness or drainage      Loss of vision in one eye      Loss of strength / weakness in one side of your body      Severe headache      Difficulty with speech      If you will be having an invasive procedure, such as a colonoscopy or dental work within one year of your surgery, you may need to take an antibiotic prior to the procedure if you had a Dacron patch with your surgery; please call us so we can assist you with this.    Call our main number, [phone], for assistance with any concerns or questions.     Revised 5/2014

## 2021-04-30 NOTE — PROGRESS NOTES
"VASCULAR SURGERY PROGRESS NOTE    Subjective:  No acute events overnight.  Systolic blood pressure 110.  No neck hematoma, no neuro deficits.  Garcia removed.  Ambulated to the bathroom.  Pain controlled and not requiring narcotics.  Wants to go home soon as possible.    Objective:    Intake/Output Summary (Last 24 hours) at 4/30/2021 0635  Last data filed at 4/30/2021 0600  Gross per 24 hour   Intake 2123 ml   Output 1255 ml   Net 868 ml     Labs:  ROUTINE IP LABS (Last four results)  BMP  Recent Labs   Lab 04/29/21  1040      POTASSIUM 3.4   CHLORIDE 109   TERRENCE 8.7   CO2 27   BUN 19   CR 0.84   GLC 92     CBC  Recent Labs   Lab 04/29/21  1040        INRNo lab results found in last 7 days.  PHYSICAL EXAM:  /58   Pulse 74   Temp 98  F (36.7  C) (Oral)   Resp 11   Ht 1.473 m (4' 10\")   Wt 50.4 kg (111 lb 1.6 oz)   SpO2 95%   BMI 23.22 kg/m    General: The patient is alert and oriented. Appropriate. No acute distress  Psych: Pleasant affect, Answers questions appropriately  Skin: Color appropriate for race, warm, dry. R CEA incision CDI  Respiratory: Breathing unlabored  GI:  Abdomen soft, nontender to light palpation.  Neuro: tongue midline, no facial drooping, 5/5 strength bilaterally upper and lower extremities, no sensory loss    Imaging:   No new imaging.    ASSESSMENT:  85 yo F w/ history of bilateral carotid stenosis and history of left CEA, now status post right CEA for 80% asymptomatic right carotid stenosis.    Doing well.  No neck hematoma.  No neuro deficits.    PLAN:  Diet as tolerated  Discontinue IV fluids  Pain control  Ambulate as tolerated  Aspirin/statin/Plavix - plavix for 1 mo    Dispo: Discharge later today after urinating after garcia removal    Alisa Shipman MD  Vascular Surgery Fellow  Pager: [phone]                  "

## 2021-04-30 NOTE — PLAN OF CARE
DATE & TIME: 4.29.21 1900-0700    Cognitive Concerns/ Orientation : AO4   BEHAVIOR & AGGRESSION TOOL COLOR: Green   ABNL VS/O2: VSS ex low RR + HR. CAPNO  MOBILITY: Bedrest w. BSC privilages  PAIN MANAGMENT: Denies   DIET: Regular  BOWEL/BLADDER: Weeks  ABNL LAB/BG: NA  DRAIN/DEVICES: 2x PIV. Left infusing NS @ 70mL/hr.  TELEMETRY RHYTHM: SR & SB  SKIN: Brusised  TESTS/PROCEDURES: NA  D/C DAY/GOALS/PLACE: Pending  OTHER IMPORTANT INFO: COVID negative. Off [facility] @ 0600. POD1: RT Carotid Stenosis: Dr. Dangelo. Neuros intact: ex slight rt lip droop from OR. [place] CDI. Left ear hearing aid.

## 2021-04-30 NOTE — PROGRESS NOTES
[facility]    Vascular Medicine Progress Note    Date of Service (when I saw the patient): 04/30/2021          Physician Supervisory Attestation:   I have reviewed and discussed with the physician assistant their history, physical and plan on  Nova Ordoñez and agree with the plan as stated in the physician assistant note.    It should be billed under [facility].    Reyna Alexandre MD 4/30/2021  Vascular Medicine         Assessment & Plan   1. Asymptomatic high grade right carotid artery stenosis s/p right carotid endarterectomy 4/29/21     -Doing well. Some numbness right side of face. Swallowing without difficulty.   -Post-operative cares and anti-platelet therapy per Dr. Dangelo / Vascular Surgery.   -She needs better control of her cholesterol (see below).   -She did undergo a left carotid endarterectomy already on 12/4/2019.     2. Hyperlipidemia     -LDL 93 son Simva 10 -> not at goal of less than 70.  -Had been on Atorvastatin in past without problem - switched back Simva as she liked the look of the pretty pink Simvastatin pill.   -Change back to Atorvastatin 20 mg daily (max dose for her given she is also on Amlodipine 10 mg daily).   -She should have a lipid panel repeated in 3 months through her primary care provider to ascertain whether or not her lipids are at goal on this regimen.      3. PAD     -She had originally presented to the Vascular Clinic for PAD back in 2019.   -She continues to have claudication, left greater than right leg, but can walk about one mile before having to stop and rest. She does not feel this is impacting her life much at this point in time.   -Her last ABIs were in 10/2019 at which time right ESTEPHANIA was 0.9 at rest, dropping to 0.68 with exercise and on the left 0.81 at rest, dropping to 0.41 with exercise.   -This can be monitored as an outpatient by Dr. Dangelo in the Vascular Clinic.      4. Hypertension     -BP well controlled overnight.   -Continue  prior to admission amlodipine.       Interval History   Doing well. Denies pain. Weeks out, voided a little. Wants to get out of bed. Anxious to go home as soon as she can. Swallowing ok. A little trouble chewing food given some numbness on right side of face.     Physical Exam   Temp: 97.5  F (36.4  C) Temp src: Axillary BP: 115/58 Pulse: 74   Resp: 11 SpO2: 95 % O2 Device: None (Room air) Oxygen Delivery: 8 LPM  Vitals:    04/29/21 1104 04/30/21 0600   Weight: 50.4 kg (111 lb 1.6 oz) 59.3 kg (130 lb 11.7 oz)     Vital Signs with Ranges  Temp:  [97.5  F (36.4  C)-98.2  F (36.8  C)] 97.5  F (36.4  C)  Pulse:  [54-80] 74  Resp:  [9-18] 11  BP: (106-146)/(54-78) 115/58  MAP:  [61 mmHg-73 mmHg] 61 mmHg  Arterial Line BP: ()/(34-47) 97/40  SpO2:  [89 %-99 %] 95 %  I/O last 3 completed shifts:  In: 2123 [P.O.:623; I.V.:1500]  Out: 1255 [Urine:1155; Blood:100]    Constitutional: Awake, alert, cooperative, no apparent distress, and appears stated age.  Eyes: Lids and lashes normal, sclera clear, conjunctiva normal.  ENT: Normocephalic, without obvious abnormality, atraumatic, oral pharynx with moist mucus membranes  Respiratory: No increased work of breathing, good air exchange, clear to auscultation bilaterally, no crackles or wheezing.  Cardiovascular: Regular rate and rhythm, normal S1 and S2, no S3 or S4, and no murmur noted.  GI: Normal bowel sounds, soft, non-distended, non-tender  Skin: No bruising or bleeding, normal skin color, texture, turgor, no redness, warmth, or swelling, no rashes, surgical site right side of neck is c/d/i.   Musculoskeletal: There is no redness, warmth, or swelling of the joints.    Neurologic: Awake, alert, oriented to name, place and time.  Cranial nerves II-XII are grossly intact.    Neuropsychiatric: Calm, normal eye contact, alert, normal affect, oriented to self, place, time and situation, memory for past and recent events intact and thought process normal.    Medications      BETA BLOCKER NOT PRESCRIBED       sodium chloride Stopped (04/30/21 0703)       acetaminophen  975 mg Oral Q8H     amLODIPine  10 mg Oral QPM     aspirin  81 mg Oral Daily     atorvastatin  20 mg Oral QPM     clopidogrel  75 mg Oral Daily     docusate sodium  100 mg Oral BID     heparin ANTICOAGULANT  5,000 Units Subcutaneous Q8H     magnesium oxide  400 mg Oral QAM     multivitamin  with lutein  1 capsule Oral QAM     multivitamin w/minerals  1 tablet Oral QAM     polyethylene glycol  17 g Oral Daily     senna-docusate  1 tablet Oral BID     sodium chloride (PF)  3 mL Intracatheter Q8H     Vitamin D3  1,000 Units Oral QPM       Data   Recent Labs   Lab 04/29/21  1040         POTASSIUM 3.4   CHLORIDE 109   CO2 27   BUN 19   CR 0.84   ANIONGAP 6   TERRENCE 8.7   GLC 92     No results found for this or any previous visit (from the past 24 hour(s)).

## 2021-05-01 LAB — INTERPRETATION ECG - MUSE: NORMAL

## 2021-05-12 ENCOUNTER — OFFICE VISIT (OUTPATIENT)
Dept: SURGERY | Facility: CLINIC | Age: 86
End: 2021-05-12
Payer: COMMERCIAL

## 2021-05-12 VITALS
BODY MASS INDEX: 27.29 KG/M2 | WEIGHT: 130 LBS | DIASTOLIC BLOOD PRESSURE: 70 MMHG | HEART RATE: 97 BPM | SYSTOLIC BLOOD PRESSURE: 100 MMHG | OXYGEN SATURATION: 96 % | RESPIRATION RATE: 16 BRPM | HEIGHT: 58 IN

## 2021-05-12 DIAGNOSIS — Z09 SURGICAL FOLLOW-UP CARE: Primary | ICD-10-CM

## 2021-05-12 DIAGNOSIS — I65.21 CAROTID STENOSIS, ASYMPTOMATIC, RIGHT: Primary | ICD-10-CM

## 2021-05-12 PROCEDURE — 99024 POSTOP FOLLOW-UP VISIT: CPT | Performed by: SURGERY

## 2021-05-12 ASSESSMENT — MIFFLIN-ST. JEOR: SCORE: 924.43

## 2021-05-12 NOTE — PROGRESS NOTES
Nova Ordoñez is POD #13 from a right carotid endarterectomy with bovine pericardial patch angioplasty.  She has had some difficulty with biting the right side of her tongue while chewing her food.  She also notes food tends to accumulate on the right side of her mouth and she has to be a bit careful about swallowing.  This is improving.  She is back to her daily activities and driving her car.  She had no other complaints.    Exam:  Pleasant female alert and oriented x3 in no acute distress.  Blood pressure 100/70 with a pulse of 97.  Smile is symmetric and tongue is in the midline.  Neurologic exam is nonfocal.  2+ palpable radial pulses bilaterally.  Her right neck incision is clear with normal postoperative changes.    IMPRESSION:  POD #13 right carotid endarterectomy overall doing fairly well.  Some issues with biting the right side of her tongue while chewing food.  No neurologic focality.    RECOMMENDATION:  I reviewed all the above with Nova.  I offered her referral to speech therapy to evaluate her swallowing.  She politely declines as she states that things are significantly improving.  I have no other vascular concerns.  She will continue her medical regimen which includes daily aspirin.  Vascular surgical follow-up will be with me in 3 months for a repeat right carotid ultrasound.    Brad Dangelo MD

## 2021-08-17 ENCOUNTER — HOSPITAL ENCOUNTER (OUTPATIENT)
Dept: ULTRASOUND IMAGING | Facility: CLINIC | Age: 86
Discharge: HOME | End: 2021-08-17
Attending: SURGERY | Admitting: SURGERY
Payer: COMMERCIAL

## 2021-08-17 DIAGNOSIS — I65.21 CAROTID STENOSIS, ASYMPTOMATIC, RIGHT: ICD-10-CM

## 2021-08-17 PROCEDURE — 93882 EXTRACRANIAL UNI/LTD STUDY: CPT | Mod: RT

## 2021-08-18 ENCOUNTER — OFFICE VISIT (OUTPATIENT)
Dept: SURGERY | Facility: CLINIC | Age: 86
End: 2021-08-18
Attending: SURGERY
Payer: COMMERCIAL

## 2021-08-18 VITALS
WEIGHT: 130 LBS | HEART RATE: 80 BPM | SYSTOLIC BLOOD PRESSURE: 100 MMHG | BODY MASS INDEX: 27.29 KG/M2 | OXYGEN SATURATION: 99 % | RESPIRATION RATE: 16 BRPM | DIASTOLIC BLOOD PRESSURE: 70 MMHG | HEIGHT: 58 IN

## 2021-08-18 DIAGNOSIS — Z98.890 HISTORY OF BILATERAL CAROTID ENDARTERECTOMY: Primary | ICD-10-CM

## 2021-08-18 DIAGNOSIS — I73.9 PERIPHERAL VASCULAR DISEASE WITH CLAUDICATION (H): ICD-10-CM

## 2021-08-18 PROCEDURE — 99213 OFFICE O/P EST LOW 20 MIN: CPT | Performed by: SURGERY

## 2021-08-18 ASSESSMENT — MIFFLIN-ST. JEOR: SCORE: 924.43

## 2021-08-19 NOTE — PROGRESS NOTES
Nova Ordoñez is an 86-year-old female who is now greater than 3 months status post uncomplicated right carotid endarterectomy performed for a high-grade asymptomatic right carotid stenosis.  She presents today for routine postoperative follow-up.  She is back to all of her normal activities.  Her prior difficulties with swallowing have totally resolved.  She denies any neurologic focality.  She does offer some complaints of lower extremity claudication symptoms.  She notes bilateral calf pain after ambulating approximately 1/2 mile (left greater than right).  Her symptoms resolved with rest and she can resume walking.  Past history is also notable for a left carotid endarterectomy in 2019.    Exam:  Pleasant female alert and oriented x3.  Blood pressure 100/70 with a pulse of 80.  Well-healed right carotid endarterectomy incision.  Cranial nerves II through XII intact.  Motor and sensory grossly normal.  Neurologic exam nonfocal.  2+ radial pulses bilaterally.  Bilateral feet are warm and pink.  Strong multiphasic right-sided pedal Doppler signals.  Strong monophasic left DP Doppler signal with a weak monophasic left PT signal.    Imaging:  ULTRASOUND CAROTID RIGHT  8/17/2021 9:41 AM      HISTORY:  Patient is status post a right carotid endarterectomy on  4/29/2021     COMPARISON: Ultrasound dated 4/6/2021     FINDINGS: No significant plaque identified. The peak systolic and  diastolic velocities in the right internal carotid artery are 98 and  19 cm/s versus 493 and 63 cm/s on the prior exam. Flow within the  right vertebral artery is antegrade.     Incidental note is made of an irregular heart rate.                                                                      IMPRESSION: Significant interval improvement in velocities in the  right internal carotid artery following carotid endarterectomy. Per  sonographic velocity criteria, degree of stenosis in the right  internal carotid artery is now less than  50%.     IRASEMA BERGMAN MD       US ANKLE-BRACHIAL DOPPLER WITH EXERCISE BILATERAL 10/7/2019 2:15 PM     HISTORY: 84-year-old patient with claudication in both calves, left  more so than right. Claudication begins at 0.5 miles.     COMPARISON: None     FINDINGS: Resting ESTEPHANIA in the right lower extremity is 0.9 and 0.81 in  the left lower extremity. Absolute pressure measurements on the right  are 133 brachial artery, 122 PT, 130 DP. On the left, absolute  pressure measurements are 144 brachial artery, 90 PT, 116 DP.  Waveforms are triphasic throughout the right lower extremity. The left  femoral waveform is triphasic. The popliteal, PT, DP waveforms are  biphasic. Patient walked on a treadmill for 4 minutes at 1.5 mph at  10% grade. Patient had bilateral knee pain at 1 minute and 30 seconds,  bilateral calf tightness at 2 minutes 45 seconds. Exercise  discontinued at 4 minutes. Postexercise ESTEPHANIA values are 0.68 on the  right and 0.41 on the left.                                                                      IMPRESSION:   1. Minimal to mild arterial insufficiency in the right lower  extremity.  2. Moderate arterial insufficiency in the left lower extremity as  demonstrated by decreased postexercise ESTEPHANIA value.     EYAL LACEY MD    ASSESSMENT:  1.  3 months status post right carotid endarterectomy clinically doing well with widely patent right carotid artery.    2.  2 years status post left carotid endarterectomy-stable.    3.  Bilateral lower extremity claudication (left greater than right).    RECOMMENDATIONS:  I reviewed all the above with Nova.  I have no carotid concerns.  She will continue her medical regimen including daily aspirin.  I have not previously evaluated her for PAD.  I reviewed the above-noted ESTEPHANIA from 2019 and check pulses in her feet.  I offered her diagnostic lower extremity angiography with possible intervention.  For now, she politely declines.  She states that her symptoms do not  significantly impact her quality of life or her activities of daily living.  Vascular surgical follow-up will therefore be with me in 1 year for bilateral carotid ultrasound and ESTEPHANIA with exercise.    Total length of this encounter was 20 minutes.    Brad Dangelo MD

## 2021-08-26 DIAGNOSIS — I65.23 BILATERAL CAROTID ARTERY STENOSIS: ICD-10-CM

## 2021-08-26 DIAGNOSIS — I73.9 CLAUDICATION (H): Primary | ICD-10-CM

## 2022-08-02 ENCOUNTER — TELEPHONE (OUTPATIENT)
Dept: OTHER | Facility: CLINIC | Age: 87
End: 2022-08-02

## 2022-08-02 NOTE — TELEPHONE ENCOUNTER
Patient is due for follow up in August 2022 with Dr. Dangelo.      Due to his absence, please offer to schedule patient with Isabell Johns NP at a time when another surgeon is in clinic.  Schedule imaging as ordered per Dr. Dangelo prior to appointment.      If patient would prefer to schedule with Dr. Dangelo upon his return, please document reason and route back to writer.      If patient declines scheduling all together or would like to call back to schedule, please document appropriately and route back to writer.  Advise patient they will not receive any further calls or reminder letters to schedule.    Fabiola Perez, CACHORRON, RN-[facility] [place]

## (undated) DEVICE — SYR 03ML LL W/O NDL 309657

## (undated) DEVICE — SOL NACL 0.9% IRRIG 1000ML BOTTLE 2F7124

## (undated) DEVICE — NDL BLUNT 19GA 1.5"

## (undated) DEVICE — SURGICEL HEMOSTAT 2X14" 1951

## (undated) DEVICE — IONM UP TO 7 HOURS

## (undated) DEVICE — SU VICRYL 3-0 CT-1 CR 8X18" J738D

## (undated) DEVICE — SYR 05ML SLIP TIP W/O NDL 309647

## (undated) DEVICE — SU SILK 2-0 TIE 24" SA75H

## (undated) DEVICE — GLOVE PROTEXIS POWDER FREE 8.0 ORTHOPEDIC 2D73ET80

## (undated) DEVICE — BLADE KNIFE BEAVER MINI BEAVER6400

## (undated) DEVICE — PREP CHLORAPREP W/ORANGE TINT 10.5ML 930715

## (undated) DEVICE — SOL WATER IRRIG 1000ML BOTTLE 2F7114

## (undated) DEVICE — PREP CHLORAPREP W/ORANGE TINT 10.5ML 260715

## (undated) DEVICE — SU SILK 3-0 SH CR 8X18" C013D

## (undated) DEVICE — SU SILK 3-0 TIE 24" SA74H

## (undated) DEVICE — IONM EEG UP TO 7 HOURS

## (undated) DEVICE — LINEN TOWEL PACK X5 5464

## (undated) DEVICE — SUCTION CANISTER MEDIVAC LINER 3000ML W/LID 65651-530

## (undated) DEVICE — SOL NACL 0.9% INJ 250ML BAG 2B1322Q

## (undated) DEVICE — PACK UNIVERSAL SPLIT 29131

## (undated) DEVICE — SHUNT SUNDT 3X4X10CM NL850-5060

## (undated) DEVICE — DECANTER BAG 2002S

## (undated) DEVICE — TUBING SUCTION 12"X1/4" N612

## (undated) DEVICE — SU VICRYL 4-0 PS-2 18" UND J496H

## (undated) DEVICE — SU SILK 0 24" TIE SA76G

## (undated) DEVICE — SU MONOCRYL 4-0 PS-2 18" UND Y496G

## (undated) DEVICE — SU PROLENE 6-0 C-1DA 30" 8706H

## (undated) DEVICE — DRSG STERI STRIP 1/2X4" R1547

## (undated) DEVICE — GLOVE PROTEXIS W/NEU-THERA 7.5  2D73TE75

## (undated) DEVICE — SU SILK 3-0 FS-1 18" 684H

## (undated) DEVICE — PACK VASCULAR SCV15VAFSB

## (undated) DEVICE — SU VICRYL 2-0 CT-1 27" J339H

## (undated) DEVICE — DRSG TELFA ISLAND 4X8" 7541

## (undated) DEVICE — SU VICRYL 3-0 CT-1 36" J338H

## (undated) DEVICE — SU PROLENE 7-0 BV-1DA 4X30" M8703

## (undated) DEVICE — SU PROLENE 6-0 C-1DA 30" M8706

## (undated) DEVICE — IOM EEG SUPPLIES

## (undated) DEVICE — NDL 19GA 1.5"

## (undated) DEVICE — ESU GROUND PAD UNIVERSAL W/O CORD

## (undated) DEVICE — WIPES FOLEY CARE SURESTEP PROVON DFC100

## (undated) DEVICE — SYR 05ML SLIP TIP W/O NDL

## (undated) DEVICE — GOWN IMPERVIOUS SPECIALTY XL/XLONG 39049

## (undated) DEVICE — CATH TRAY FOLEY SURESTEP 16FR W/URINE MTR STATLK LF A303416A

## (undated) DEVICE — IONM EEG SUPPLIES

## (undated) RX ORDER — PROPOFOL 10 MG/ML
INJECTION, EMULSION INTRAVENOUS
Status: DISPENSED
Start: 2019-12-04

## (undated) RX ORDER — HYDROMORPHONE HYDROCHLORIDE 1 MG/ML
INJECTION, SOLUTION INTRAMUSCULAR; INTRAVENOUS; SUBCUTANEOUS
Status: DISPENSED
Start: 2019-12-04

## (undated) RX ORDER — CEFAZOLIN SODIUM 2 G/100ML
INJECTION, SOLUTION INTRAVENOUS
Status: DISPENSED
Start: 2019-12-04

## (undated) RX ORDER — PROPOFOL 10 MG/ML
INJECTION, EMULSION INTRAVENOUS
Status: DISPENSED
Start: 2021-04-29

## (undated) RX ORDER — ONDANSETRON 2 MG/ML
INJECTION INTRAMUSCULAR; INTRAVENOUS
Status: DISPENSED
Start: 2021-04-29

## (undated) RX ORDER — LIDOCAINE HYDROCHLORIDE 10 MG/ML
INJECTION, SOLUTION EPIDURAL; INFILTRATION; INTRACAUDAL; PERINEURAL
Status: DISPENSED
Start: 2019-12-04

## (undated) RX ORDER — FENTANYL CITRATE 50 UG/ML
INJECTION, SOLUTION INTRAMUSCULAR; INTRAVENOUS
Status: DISPENSED
Start: 2019-12-04

## (undated) RX ORDER — ASPIRIN 600 MG/1
SUPPOSITORY RECTAL
Status: DISPENSED
Start: 2021-04-29

## (undated) RX ORDER — KETOROLAC TROMETHAMINE 30 MG/ML
INJECTION, SOLUTION INTRAMUSCULAR; INTRAVENOUS
Status: DISPENSED
Start: 2021-04-29

## (undated) RX ORDER — FENTANYL CITRATE 50 UG/ML
INJECTION, SOLUTION INTRAMUSCULAR; INTRAVENOUS
Status: DISPENSED
Start: 2021-04-29

## (undated) RX ORDER — LIDOCAINE HYDROCHLORIDE 10 MG/ML
INJECTION, SOLUTION EPIDURAL; INFILTRATION; INTRACAUDAL; PERINEURAL
Status: DISPENSED
Start: 2021-04-29

## (undated) RX ORDER — ASPIRIN 600 MG/1
SUPPOSITORY RECTAL
Status: DISPENSED
Start: 2019-12-04

## (undated) RX ORDER — CEFAZOLIN SODIUM 2 G/100ML
INJECTION, SOLUTION INTRAVENOUS
Status: DISPENSED
Start: 2021-04-29

## (undated) RX ORDER — HEPARIN SODIUM 1000 [USP'U]/ML
INJECTION, SOLUTION INTRAVENOUS; SUBCUTANEOUS
Status: DISPENSED
Start: 2021-04-29